# Patient Record
Sex: FEMALE | Race: WHITE | Employment: OTHER | URBAN - METROPOLITAN AREA
[De-identification: names, ages, dates, MRNs, and addresses within clinical notes are randomized per-mention and may not be internally consistent; named-entity substitution may affect disease eponyms.]

---

## 2021-04-08 ENCOUNTER — TELEPHONE (OUTPATIENT)
Dept: INTERNAL MEDICINE CLINIC | Facility: CLINIC | Age: 66
End: 2021-04-08

## 2021-04-08 PROBLEM — N32.0 BLADDER OUTLET OBSTRUCTION: Status: ACTIVE | Noted: 2018-07-16

## 2021-04-08 PROBLEM — M25.50 POLYARTHRALGIA: Status: ACTIVE | Noted: 2018-12-27

## 2021-04-08 PROBLEM — M51.369 DDD (DEGENERATIVE DISC DISEASE), LUMBAR: Status: ACTIVE | Noted: 2019-01-14

## 2021-04-08 PROBLEM — E04.1 LEFT THYROID NODULE: Status: ACTIVE | Noted: 2019-05-07

## 2021-04-08 PROBLEM — M51.36 DDD (DEGENERATIVE DISC DISEASE), LUMBAR: Status: ACTIVE | Noted: 2019-01-14

## 2021-04-08 PROBLEM — R92.8 ABNORMAL MAMMOGRAM OF RIGHT BREAST: Status: ACTIVE | Noted: 2019-01-15

## 2021-04-08 PROBLEM — M79.7 FIBROMYALGIA: Status: ACTIVE | Noted: 2018-12-11

## 2021-04-08 PROBLEM — R73.9 HYPERGLYCEMIA: Status: ACTIVE | Noted: 2018-08-14

## 2021-04-08 PROBLEM — Z85.850 HISTORY OF THYROID CANCER: Status: ACTIVE | Noted: 2021-04-08

## 2021-04-08 PROBLEM — M81.0 OSTEOPOROSIS: Status: ACTIVE | Noted: 2018-08-14

## 2021-04-08 NOTE — TELEPHONE ENCOUNTER
Patient has been vaccinated for covid. Vaccine name: Pfizer   Vaccine date 1: 02/04/2021  Vaccine lot 1: [id number]  Vaccine date 2: 02/25/2021  Vaccine Lot 2: [id number]  Name of location:[facility]       Please remove any covid vaccine orders please.

## 2021-09-02 RX ORDER — CYCLOBENZAPRINE HCL 10 MG
TABLET ORAL
Qty: 90 TABLET | Refills: 0 | Status: SHIPPED | OUTPATIENT
Start: 2021-09-02 | End: 2021-12-01

## 2021-10-26 ENCOUNTER — OFFICE VISIT (OUTPATIENT)
Dept: INTERNAL MEDICINE CLINIC | Facility: CLINIC | Age: 66
End: 2021-10-26
Payer: MEDICARE

## 2021-10-26 VITALS
OXYGEN SATURATION: 100 % | HEIGHT: 69.69 IN | SYSTOLIC BLOOD PRESSURE: 128 MMHG | DIASTOLIC BLOOD PRESSURE: 78 MMHG | HEART RATE: 66 BPM | TEMPERATURE: 98 F | BODY MASS INDEX: 19.52 KG/M2 | WEIGHT: 134.81 LBS

## 2021-10-26 DIAGNOSIS — K58.1 IRRITABLE BOWEL SYNDROME WITH CONSTIPATION: ICD-10-CM

## 2021-10-26 DIAGNOSIS — R10.13 EPIGASTRIC PAIN: Primary | ICD-10-CM

## 2021-10-26 DIAGNOSIS — Z13.220 LIPID SCREENING: ICD-10-CM

## 2021-10-26 DIAGNOSIS — R19.00 ABDOMINAL PULSATILE MASS: ICD-10-CM

## 2021-10-26 DIAGNOSIS — F17.210 CIGARETTE SMOKER: ICD-10-CM

## 2021-10-26 DIAGNOSIS — E04.1 THYROID NODULE GREATER THAN OR EQUAL TO 1.5 CM IN DIAMETER INCIDENTALLY NOTED ON IMAGING STUDY: ICD-10-CM

## 2021-10-26 PROCEDURE — 99214 OFFICE O/P EST MOD 30 MIN: CPT | Performed by: INTERNAL MEDICINE

## 2021-10-26 PROCEDURE — 36415 COLL VENOUS BLD VENIPUNCTURE: CPT | Performed by: INTERNAL MEDICINE

## 2021-10-26 PROCEDURE — 3078F DIAST BP <80 MM HG: CPT | Performed by: INTERNAL MEDICINE

## 2021-10-26 PROCEDURE — 3074F SYST BP LT 130 MM HG: CPT | Performed by: INTERNAL MEDICINE

## 2021-10-26 PROCEDURE — 3008F BODY MASS INDEX DOCD: CPT | Performed by: INTERNAL MEDICINE

## 2021-10-26 NOTE — PROGRESS NOTES
Tadeo Chamorro  77year old female  Patient presents with:  Establish Care  Abdominal Pain: pain got worse recently  . HPI:       49-year-old woman I have known for several years. Last time I saw her her mother had recently .   She now is ta HIVES    Comment:strawberries  Penicillin V            UNKNOWN   CYCLOBENZAPRINE 10 MG Oral Tab, TAKE 1 TABLET NIGHTLY AS NEEDED FOR MUSCLE PAIN, Disp: 90 tablet, Rfl: 0  Oxybutynin Chloride ER 10 MG Oral Tablet 24 Hr, Take 10 mg by mouth daily. Chrissy Contreras last year. I will see her back after her CAT scan and labs. This visit lasted 30 minutes. This note was prepared using [employer] voice recognition dictation software and as a result, errors may occur.  When identified, these errors have be

## 2021-10-29 ENCOUNTER — TELEPHONE (OUTPATIENT)
Dept: INTERNAL MEDICINE CLINIC | Facility: CLINIC | Age: 66
End: 2021-10-29

## 2021-11-04 ENCOUNTER — TELEPHONE (OUTPATIENT)
Dept: INTERNAL MEDICINE CLINIC | Facility: CLINIC | Age: 66
End: 2021-11-04

## 2021-11-04 DIAGNOSIS — K58.1 IRRITABLE BOWEL SYNDROME WITH CONSTIPATION: Primary | ICD-10-CM

## 2021-11-04 NOTE — TELEPHONE ENCOUNTER
Called patient to have them redo labs since sneha received the bag with patient name but wrong tubes that don't belong to our office. Patient stated that her car is in the shop and doesn't when she will get it back.  I told patient to call us back when her

## 2021-11-22 ENCOUNTER — HOSPITAL ENCOUNTER (OUTPATIENT)
Dept: CT IMAGING | Facility: HOSPITAL | Age: 66
Discharge: HOME | End: 2021-11-22
Attending: INTERNAL MEDICINE
Payer: MEDICARE

## 2021-11-22 ENCOUNTER — HOSPITAL ENCOUNTER (OUTPATIENT)
Dept: ULTRASOUND IMAGING | Facility: HOSPITAL | Age: 66
Discharge: HOME | End: 2021-11-22
Attending: INTERNAL MEDICINE
Payer: MEDICARE

## 2021-11-22 DIAGNOSIS — R10.13 EPIGASTRIC PAIN: ICD-10-CM

## 2021-11-22 DIAGNOSIS — R19.00 ABDOMINAL PULSATILE MASS: ICD-10-CM

## 2021-11-22 DIAGNOSIS — E04.1 THYROID NODULE GREATER THAN OR EQUAL TO 1.5 CM IN DIAMETER INCIDENTALLY NOTED ON IMAGING STUDY: ICD-10-CM

## 2021-11-22 PROCEDURE — 76536 US EXAM OF HEAD AND NECK: CPT | Performed by: INTERNAL MEDICINE

## 2021-11-22 PROCEDURE — 74176 CT ABD & PELVIS W/O CONTRAST: CPT | Performed by: INTERNAL MEDICINE

## 2021-12-01 ENCOUNTER — OFFICE VISIT (OUTPATIENT)
Dept: INTERNAL MEDICINE CLINIC | Facility: CLINIC | Age: 66
End: 2021-12-01
Payer: MEDICARE

## 2021-12-01 VITALS
HEART RATE: 53 BPM | BODY MASS INDEX: 19.52 KG/M2 | HEIGHT: 69.69 IN | OXYGEN SATURATION: 94 % | DIASTOLIC BLOOD PRESSURE: 70 MMHG | TEMPERATURE: 97 F | WEIGHT: 134.81 LBS | SYSTOLIC BLOOD PRESSURE: 112 MMHG

## 2021-12-01 DIAGNOSIS — R35.0 URINARY FREQUENCY: ICD-10-CM

## 2021-12-01 DIAGNOSIS — I73.00 RAYNAUD'S DISEASE WITHOUT GANGRENE: Primary | ICD-10-CM

## 2021-12-01 DIAGNOSIS — M81.8 OTHER OSTEOPOROSIS WITHOUT CURRENT PATHOLOGICAL FRACTURE: ICD-10-CM

## 2021-12-01 DIAGNOSIS — I70.0 ATHEROSCLEROSIS OF ABDOMINAL AORTA (HCC): ICD-10-CM

## 2021-12-01 DIAGNOSIS — F17.210 CIGARETTE SMOKER: ICD-10-CM

## 2021-12-01 DIAGNOSIS — E04.2 MULTIPLE THYROID NODULES: ICD-10-CM

## 2021-12-01 PROCEDURE — 99214 OFFICE O/P EST MOD 30 MIN: CPT | Performed by: INTERNAL MEDICINE

## 2021-12-01 PROCEDURE — 36415 COLL VENOUS BLD VENIPUNCTURE: CPT | Performed by: INTERNAL MEDICINE

## 2021-12-01 PROCEDURE — 3074F SYST BP LT 130 MM HG: CPT | Performed by: INTERNAL MEDICINE

## 2021-12-01 PROCEDURE — 3078F DIAST BP <80 MM HG: CPT | Performed by: INTERNAL MEDICINE

## 2021-12-01 PROCEDURE — 3008F BODY MASS INDEX DOCD: CPT | Performed by: INTERNAL MEDICINE

## 2021-12-01 RX ORDER — CYCLOBENZAPRINE HCL 10 MG
10 TABLET ORAL NIGHTLY PRN
Qty: 90 TABLET | Refills: 2 | Status: SHIPPED | OUTPATIENT
Start: 2021-12-01

## 2021-12-01 RX ORDER — OXYBUTYNIN CHLORIDE 10 MG/1
10 TABLET, EXTENDED RELEASE ORAL DAILY
Qty: 90 TABLET | Refills: 3 | Status: SHIPPED | OUTPATIENT
Start: 2021-12-01

## 2021-12-01 RX ORDER — NIFEDIPINE 30 MG/1
30 TABLET, FILM COATED, EXTENDED RELEASE ORAL DAILY
Qty: 30 TABLET | Refills: 2 | Status: SHIPPED | OUTPATIENT
Start: 2021-12-01

## 2021-12-01 RX ORDER — ALENDRONATE SODIUM 70 MG/1
70 TABLET ORAL WEEKLY
Qty: 12 TABLET | Refills: 3 | Status: SHIPPED | OUTPATIENT
Start: 2021-12-01

## 2021-12-01 RX ORDER — NITROFURANTOIN 25; 75 MG/1; MG/1
100 CAPSULE ORAL 2 TIMES DAILY
Qty: 20 CAPSULE | Refills: 0 | Status: SHIPPED | OUTPATIENT
Start: 2021-12-01 | End: 2021-12-11

## 2021-12-01 NOTE — PROGRESS NOTES
Estefania Tyson  77year old female  Patient presents with:  Abdominal Pain: follow-up  Urinary: patient thinks she might have an infection  .           HPI:       Since last visit Soco Fartun has had a CAT scan which did not show any significant path oxybutynin. Myrbetriq was prescribed but was too expensive    HISTORY:     Past Medical History:   Diagnosis Date   • Osteoporosis      Past Surgical History:   Procedure Laterality Date   • Hysterectomy        History reviewed.  No pertinent family histor NIFEdipine 30 MG Oral Tablet 24 Hr; Take 1 tablet (30 mg total) by mouth daily. -     nitrofurantoin monohydrate macro 100 MG Oral Cap; Take 1 capsule (100 mg total) by mouth 2 (two) times daily for 10 days. -     alendronate 70 MG Oral Tab;  Take 1 table

## 2022-01-05 ENCOUNTER — HOSPITAL ENCOUNTER (OUTPATIENT)
Dept: BONE DENSITY | Facility: HOSPITAL | Age: 67
Discharge: HOME | End: 2022-01-05
Attending: INTERNAL MEDICINE
Payer: MEDICARE

## 2022-01-05 DIAGNOSIS — M81.8 OTHER OSTEOPOROSIS WITHOUT CURRENT PATHOLOGICAL FRACTURE: ICD-10-CM

## 2022-01-05 PROCEDURE — 77080 DXA BONE DENSITY AXIAL: CPT | Performed by: INTERNAL MEDICINE

## 2022-01-13 ENCOUNTER — HOSPITAL ENCOUNTER (OUTPATIENT)
Dept: CT IMAGING | Facility: HOSPITAL | Age: 67
Discharge: HOME | End: 2022-01-13
Attending: INTERNAL MEDICINE

## 2022-01-13 DIAGNOSIS — Z13.6 SCREENING FOR CARDIOVASCULAR CONDITION: ICD-10-CM

## 2022-01-14 ENCOUNTER — LAB ENCOUNTER (OUTPATIENT)
Dept: LAB | Facility: HOSPITAL | Age: 67
End: 2022-01-14
Attending: INTERNAL MEDICINE
Payer: MEDICARE

## 2022-01-14 ENCOUNTER — OFFICE VISIT (OUTPATIENT)
Dept: RHEUMATOLOGY | Facility: CLINIC | Age: 67
End: 2022-01-14
Payer: COMMERCIAL

## 2022-01-14 VITALS
HEART RATE: 67 BPM | BODY MASS INDEX: 20.13 KG/M2 | HEIGHT: 69.69 IN | WEIGHT: 139 LBS | DIASTOLIC BLOOD PRESSURE: 80 MMHG | SYSTOLIC BLOOD PRESSURE: 131 MMHG

## 2022-01-14 DIAGNOSIS — I73.00 RAYNAUD'S DISEASE WITHOUT GANGRENE: Primary | ICD-10-CM

## 2022-01-14 DIAGNOSIS — M25.50 POLYARTHRALGIA: ICD-10-CM

## 2022-01-14 LAB
ALBUMIN SERPL-MCNC: 3.8 G/DL (ref 3.4–5)
ALBUMIN/GLOB SERPL: 1.3 {RATIO} (ref 1–2)
ALP LIVER SERPL-CCNC: 70 U/L
ALT SERPL-CCNC: 12 U/L
ANION GAP SERPL CALC-SCNC: 6 MMOL/L (ref 0–18)
AST SERPL-CCNC: 20 U/L (ref 15–37)
BILIRUB SERPL-MCNC: 0.3 MG/DL (ref 0.1–2)
BUN BLD-MCNC: 15 MG/DL (ref 7–18)
BUN/CREAT SERPL: 22.7 (ref 10–20)
CALCIUM BLD-MCNC: 9 MG/DL (ref 8.5–10.1)
CHLORIDE SERPL-SCNC: 105 MMOL/L (ref 98–112)
CK SERPL-CCNC: 69 U/L
CO2 SERPL-SCNC: 31 MMOL/L (ref 21–32)
CREAT BLD-MCNC: 0.66 MG/DL
CRP SERPL-MCNC: <0.29 MG/DL (ref ?–0.3)
ERYTHROCYTE [SEDIMENTATION RATE] IN BLOOD: 10 MM/HR
FASTING STATUS PATIENT QL REPORTED: NO
GLOBULIN PLAS-MCNC: 3 G/DL (ref 2.8–4.4)
GLUCOSE BLD-MCNC: 102 MG/DL (ref 70–99)
OSMOLALITY SERPL CALC.SUM OF ELEC: 295 MOSM/KG (ref 275–295)
POTASSIUM SERPL-SCNC: 4 MMOL/L (ref 3.5–5.1)
PROT SERPL-MCNC: 6.8 G/DL (ref 6.4–8.2)
RHEUMATOID FACT SERPL-ACNC: <10 IU/ML (ref ?–15)
SODIUM SERPL-SCNC: 142 MMOL/L (ref 136–145)

## 2022-01-14 PROCEDURE — 99204 OFFICE O/P NEW MOD 45 MIN: CPT | Performed by: INTERNAL MEDICINE

## 2022-01-14 PROCEDURE — 3075F SYST BP GE 130 - 139MM HG: CPT | Performed by: INTERNAL MEDICINE

## 2022-01-14 PROCEDURE — 80053 COMPREHEN METABOLIC PANEL: CPT | Performed by: INTERNAL MEDICINE

## 2022-01-14 PROCEDURE — 86140 C-REACTIVE PROTEIN: CPT | Performed by: INTERNAL MEDICINE

## 2022-01-14 PROCEDURE — 82550 ASSAY OF CK (CPK): CPT | Performed by: INTERNAL MEDICINE

## 2022-01-14 PROCEDURE — 86200 CCP ANTIBODY: CPT | Performed by: INTERNAL MEDICINE

## 2022-01-14 PROCEDURE — 3008F BODY MASS INDEX DOCD: CPT | Performed by: INTERNAL MEDICINE

## 2022-01-14 PROCEDURE — 36415 COLL VENOUS BLD VENIPUNCTURE: CPT | Performed by: INTERNAL MEDICINE

## 2022-01-14 PROCEDURE — 85652 RBC SED RATE AUTOMATED: CPT | Performed by: INTERNAL MEDICINE

## 2022-01-14 PROCEDURE — 3079F DIAST BP 80-89 MM HG: CPT | Performed by: INTERNAL MEDICINE

## 2022-01-14 PROCEDURE — 86431 RHEUMATOID FACTOR QUANT: CPT | Performed by: INTERNAL MEDICINE

## 2022-01-14 NOTE — PATIENT INSTRUCTIONS
You were seen today for Raynaud's where your fingers can turn white in the cold  Your symptoms appear to be primary, as your blood work for lupus was negative  The medication nifedipine may help with some of your symptoms, please continue to take it  Will to primary Raynaud disease discussed above) and may be more severe. If this is the case for you, you and your healthcare provider can discuss treatment for the underlying condition. What are the risk factors?   Risk factors for Raynaud disease include:  · skin color returns to normal.  In some people, symptoms are persistent or troubling. For these cases, other treatments are a choice. Your healthcare provider can tell you more about the following:  · Prescription medicines.  Some medicines that relax and wi

## 2022-01-14 NOTE — PROGRESS NOTES
Zachariah Casiano is a 77year old female who presents for Patient presents with:  Consult  Raynaud's Syndrome: Evaluation for Raynauds  Finger Pain  .    HPI:   CC: Raynaunds  Consult: referred by PCP Dr. Aidee Feliciano    This is a 76 yo F with hx of Multipl Diagnosis Date   • Osteoporosis       Past Surgical History:   Procedure Laterality Date   • HYSTERECTOMY        No family history on file.    Social History:  Social History    Tobacco Use      Smoking status: Current Every Day Smoker      Smokeless toba 3.80 - 5.10 Million/uL 4.85   Hemoglobin      11.7 - 15.5 g/dL 14.7   Hematocrit      35.0 - 45.0 % 42.9   MCV      80.0 - 100.0 fL 88.5   MCH      27.0 - 33.0 pg 30.3   MCHC      32.0 - 36.0 g/dL 34.3   RDW      11.0 - 15.0 % 12.5   Platelet Count      14 Raynaud's    Chronic pain/fibromyalgia  - Currently on Flexeril 10 mg nightly    Thank you for allowing me to participate in this patients care.  Pt will be notified of blood work and if f/u is needed     Jamari Ferguson MD  1/14/2022  2:07 PM

## 2022-01-18 LAB — CCP IGG SERPL-ACNC: 2 U/ML (ref 0–6.9)

## 2022-01-20 ENCOUNTER — OFFICE VISIT (OUTPATIENT)
Dept: GASTROENTEROLOGY | Facility: CLINIC | Age: 67
End: 2022-01-20
Payer: MEDICARE

## 2022-01-20 ENCOUNTER — TELEPHONE (OUTPATIENT)
Dept: GASTROENTEROLOGY | Facility: CLINIC | Age: 67
End: 2022-01-20

## 2022-01-20 VITALS
HEART RATE: 71 BPM | SYSTOLIC BLOOD PRESSURE: 124 MMHG | HEIGHT: 69 IN | BODY MASS INDEX: 20.73 KG/M2 | DIASTOLIC BLOOD PRESSURE: 75 MMHG | WEIGHT: 140 LBS

## 2022-01-20 DIAGNOSIS — Z12.11 SCREENING FOR COLORECTAL CANCER: ICD-10-CM

## 2022-01-20 DIAGNOSIS — Z87.19 HISTORY OF GASTRIC POLYP: ICD-10-CM

## 2022-01-20 DIAGNOSIS — K21.9 GASTROESOPHAGEAL REFLUX DISEASE, UNSPECIFIED WHETHER ESOPHAGITIS PRESENT: ICD-10-CM

## 2022-01-20 DIAGNOSIS — Z86.010 HISTORY OF COLON POLYPS: Primary | ICD-10-CM

## 2022-01-20 DIAGNOSIS — Z12.11 SCREEN FOR COLON CANCER: Primary | ICD-10-CM

## 2022-01-20 DIAGNOSIS — Z12.12 SCREENING FOR COLORECTAL CANCER: ICD-10-CM

## 2022-01-20 PROCEDURE — 3074F SYST BP LT 130 MM HG: CPT | Performed by: INTERNAL MEDICINE

## 2022-01-20 PROCEDURE — 99203 OFFICE O/P NEW LOW 30 MIN: CPT | Performed by: INTERNAL MEDICINE

## 2022-01-20 PROCEDURE — 3078F DIAST BP <80 MM HG: CPT | Performed by: INTERNAL MEDICINE

## 2022-01-20 PROCEDURE — 3008F BODY MASS INDEX DOCD: CPT | Performed by: INTERNAL MEDICINE

## 2022-01-20 NOTE — TELEPHONE ENCOUNTER
Scheduled for:  Colonoscopy [phone] / [phone]  Provider Name:  Dr. Luis Felipe Maza  Date:  5/17/2022  Location:  [facility]  Sedation:  MAC  Time:  1:45 pm, arrival 12:45 pm  Prep:  split dose magnesium citrate preparation (10 ounces the evening before and 10 ounces

## 2022-01-20 NOTE — PATIENT INSTRUCTIONS
1. Schedule colonoscopy following a split dose magnesium citrate preparation (10 ounces the evening before and 10 ounces 6 hours before the procedure). Take #2 Dulcolax tablets an hour before the first dose of magnesium citrate. Monitored anesthesia care.

## 2022-01-22 PROBLEM — R63.4 WEIGHT LOSS, ABNORMAL: Status: RESOLVED | Noted: 2022-01-22 | Resolved: 2022-01-22

## 2022-01-22 PROBLEM — R63.4 WEIGHT LOSS, ABNORMAL: Status: ACTIVE | Noted: 2022-01-22

## 2022-01-22 NOTE — PROGRESS NOTES
Subjective:   Patient ID: Earnest Simons is a 77year old female. HPI  The patient is referred by Dr. Ciera Alves for colorectal cancer screening in the setting of a previous history of \"polyps\" and chronic abdominal symptoms.     The patient descri above    Wt Readings from Last 7 Encounters:  01/20/22 : 140 lb (63.5 kg)  01/14/22 : 139 lb (63 kg)  12/01/21 : 134 lb 12.8 oz (61.1 kg)  10/26/21 : 134 lb 12.8 oz (61.1 kg)      Current Outpatient Medications   Medication Sig Dispense Refill   • NIFEdipi (H) 95   Sodium      136 - 145 mmol/L 142 144   Potassium      3.5 - 5.1 mmol/L 4.0 4.5   Chloride      98 - 112 mmol/L 105 104   Carbon Dioxide, Total      21.0 - 32.0 mmol/L 31.0 24   ANION GAP      0 - 18 mmol/L 6    BUN      7 - 18 mg/dL 15 12   CREATI ABDOMEN+PELVIS(CPT=74176)       COMPARISON:   None.       INDICATIONS:   R19.00 Abdominal pulsatile mass R10.13 Epigastric pain, generalized abdominal pain.       TECHNIQUE: CT images of the abdomen and pelvis were obtained without intravenous contrast mat             Assessment & Plan:   History of colon polyps  (primary encounter diagnosis)  Screening for colorectal cancer  Gastroesophageal reflux disease, unspecified whether esophagitis present  The patient has a remote history of colon polyps, size and

## 2022-04-11 ENCOUNTER — TELEPHONE (OUTPATIENT)
Dept: GASTROENTEROLOGY | Facility: CLINIC | Age: 67
End: 2022-04-11

## 2022-04-11 NOTE — TELEPHONE ENCOUNTER
Pt may have to have to reschedule her procedure, she wants to know how far out it would be.  Please call

## 2022-08-26 RX ORDER — CYCLOBENZAPRINE HCL 10 MG
10 TABLET ORAL NIGHTLY PRN
Qty: 30 TABLET | Refills: 0 | Status: SHIPPED | OUTPATIENT
Start: 2022-08-26 | End: 2022-09-29

## 2022-09-22 ENCOUNTER — TELEPHONE (OUTPATIENT)
Dept: GASTROENTEROLOGY | Facility: CLINIC | Age: 67
End: 2022-09-22

## 2022-09-22 NOTE — TELEPHONE ENCOUNTER
Managed care    Patient called because she was told by her insurance that her procedure on 10/4 requires a prior authorization. Per referral it is marked as authorized, but I don't see an authorization number. Is she good to proceed with Colonoscopy and EGD as scheduled?     Thank you

## 2022-09-22 NOTE — TELEPHONE ENCOUNTER
Pt states that she called insurance and was told that her 10/4/22 colonoscopy will need prior auth. Please call [facility] at [phone].

## 2022-09-23 NOTE — TELEPHONE ENCOUNTER
Patient contacted. Informed of below message from Mary Bolaños in regards of procedure prior authorization.

## 2022-09-23 NOTE — TELEPHONE ENCOUNTER
Anthony Cline,    A new referral was not created when the patient r/s so I was unaware of the change. Patient's colon/egd is now authorized for 10/4/22.     Thank you  Christiane Nixon

## 2022-09-29 RX ORDER — CYCLOBENZAPRINE HCL 10 MG
10 TABLET ORAL NIGHTLY PRN
Qty: 10 TABLET | Refills: 0 | Status: SHIPPED | OUTPATIENT
Start: 2022-09-29 | End: 2022-10-10

## 2022-10-02 ENCOUNTER — LAB ENCOUNTER (OUTPATIENT)
Dept: LAB | Facility: HOSPITAL | Age: 67
End: 2022-10-02
Attending: INTERNAL MEDICINE
Payer: MEDICARE

## 2022-10-02 DIAGNOSIS — Z01.818 PRE-OP TESTING: ICD-10-CM

## 2022-10-02 LAB — SARS-COV-2 RNA RESP QL NAA+PROBE: NOT DETECTED

## 2022-10-03 ENCOUNTER — TELEPHONE (OUTPATIENT)
Dept: GASTROENTEROLOGY | Facility: CLINIC | Age: 67
End: 2022-10-03

## 2022-10-03 ENCOUNTER — PATIENT MESSAGE (OUTPATIENT)
Dept: GASTROENTEROLOGY | Facility: CLINIC | Age: 67
End: 2022-10-03

## 2022-10-03 RX ORDER — SODIUM PICOSULFATE, MAGNESIUM OXIDE, AND ANHYDROUS CITRIC ACID 10; 3.5; 12 MG/160ML; G/160ML; G/160ML
1 LIQUID ORAL ONCE
Qty: 1 EACH | Refills: 0 | Status: SHIPPED | OUTPATIENT
Start: 2022-10-03 | End: 2022-10-03 | Stop reason: ALTCHOICE

## 2022-10-03 RX ORDER — SODIUM, POTASSIUM,MAG SULFATES 17.5-3.13G
SOLUTION, RECONSTITUTED, ORAL ORAL
Qty: 1 EACH | Refills: 0 | Status: SHIPPED | OUTPATIENT
Start: 2022-10-03

## 2022-10-03 NOTE — TELEPHONE ENCOUNTER
Called and spoke with [facility]. She has no preference on utilizing either Clenpiq (requires PA) vs. Suprep. Informed her I will call her 5 Isaura Klever in [facility] to obtain a price estimate     Spoke to Latosha with [address] [place] [address] Klever, who states that since e-scripts were just sent, there are 10 alternate medications to be processed before she is able to provide an estimate on cost.    Recommended to call back in one hour. I called and informed [facility] on the above. She is aware of plan & will wait for a call back from office/pharmacy to inform her of the cost estimate for the suprep & Clenpiq (if w/o insurance coverage).

## 2022-10-03 NOTE — TELEPHONE ENCOUNTER
Called Pharmacy and spoke with [employer]. States that they just sent a fax to our office, requesting that a new script be sent, as Clenpiq is not covered. States Suprep and Klaus should be covered per patient's plan. Checked faxes, but have not yet received. Patient informed that Suprep was sent to the pharmacy and to contact them with an exact  time. Asked to contact GI office if she runs into any issues in obtaining the Rx.

## 2022-10-03 NOTE — TELEPHONE ENCOUNTER
Please inform the patient that magnesium citrate is not available. We could try to obtain prior authorization for Clenpiq which would be the lowest volume preparation or Suprep. Patient preference.

## 2022-10-04 NOTE — TELEPHONE ENCOUNTER
Called patient's pharmacy and spoke with Teddy Smart, Pharmacist.    States that Suprep needed to be ordered and was therefore unable to be fulfilled yesterday evening. Inquired if patient picked up the other prep that was sent in, Clenpiq, which she informs she did not. Called to speak with [facility] regarding the above & determine if/how she prepped for today's procedure. Will likely need to cancel. LMTCB. Please transfer to GI RN. Thank you!

## 2022-10-10 ENCOUNTER — OFFICE VISIT (OUTPATIENT)
Dept: INTERNAL MEDICINE CLINIC | Facility: CLINIC | Age: 67
End: 2022-10-10
Payer: MEDICARE

## 2022-10-10 VITALS
DIASTOLIC BLOOD PRESSURE: 88 MMHG | BODY MASS INDEX: 20 KG/M2 | WEIGHT: 136 LBS | TEMPERATURE: 97 F | SYSTOLIC BLOOD PRESSURE: 130 MMHG | OXYGEN SATURATION: 99 % | HEART RATE: 71 BPM

## 2022-10-10 DIAGNOSIS — Z12.31 BREAST CANCER SCREENING BY MAMMOGRAM: ICD-10-CM

## 2022-10-10 DIAGNOSIS — F17.210 CIGARETTE SMOKER: ICD-10-CM

## 2022-10-10 DIAGNOSIS — R35.0 URINARY FREQUENCY: ICD-10-CM

## 2022-10-10 DIAGNOSIS — N32.89 BLADDER WALL THICKENING: ICD-10-CM

## 2022-10-10 DIAGNOSIS — M81.8 OTHER OSTEOPOROSIS WITHOUT CURRENT PATHOLOGICAL FRACTURE: Primary | ICD-10-CM

## 2022-10-10 LAB
APPEARANCE: CLEAR
BILIRUBIN: NEGATIVE
GLUCOSE (URINE DIPSTICK): NEGATIVE MG/DL
KETONES (URINE DIPSTICK): NEGATIVE MG/DL
LEUKOCYTES: NEGATIVE
MULTISTIX LOT#: ABNORMAL NUMERIC
NITRITE, URINE: NEGATIVE
PH, URINE: 6.5 (ref 4.5–8)
PROTEIN (URINE DIPSTICK): NEGATIVE MG/DL
SPECIFIC GRAVITY: 1.01 (ref 1–1.03)
UROBILINOGEN,SEMI-QN: 0.2 MG/DL (ref 0–1.9)

## 2022-10-10 RX ORDER — CYCLOBENZAPRINE HCL 10 MG
10 TABLET ORAL NIGHTLY PRN
Qty: 10 TABLET | Refills: 0 | Status: CANCELLED | OUTPATIENT
Start: 2022-10-10

## 2022-10-10 RX ORDER — CYCLOBENZAPRINE HCL 10 MG
10 TABLET ORAL NIGHTLY PRN
Qty: 30 TABLET | Refills: 3 | Status: SHIPPED | OUTPATIENT
Start: 2022-10-10

## 2022-10-11 ENCOUNTER — LAB REQUISITION (OUTPATIENT)
Dept: SURGERY | Age: 67
End: 2022-10-11
Payer: MEDICARE

## 2022-10-11 DIAGNOSIS — Z01.818 PREOP EXAMINATION: ICD-10-CM

## 2022-10-12 LAB — SARS-COV-2 RNA RESP QL NAA+PROBE: NOT DETECTED

## 2022-10-14 ENCOUNTER — LAB REQUISITION (OUTPATIENT)
Dept: SURGERY | Age: 67
End: 2022-10-14
Payer: MEDICARE

## 2022-10-14 ENCOUNTER — SURGERY CENTER DOCUMENTATION (OUTPATIENT)
Dept: SURGERY | Age: 67
End: 2022-10-14

## 2022-10-14 DIAGNOSIS — Z12.11 COLON CANCER SCREENING: ICD-10-CM

## 2022-10-14 DIAGNOSIS — Z87.19 PERSONAL HISTORY OF OTHER DISEASES OF DIGESTIVE SYSTEM: ICD-10-CM

## 2022-10-14 DIAGNOSIS — K21.9 GERD (GASTROESOPHAGEAL REFLUX DISEASE): ICD-10-CM

## 2022-10-14 PROCEDURE — 43239 EGD BIOPSY SINGLE/MULTIPLE: CPT | Performed by: INTERNAL MEDICINE

## 2022-10-14 PROCEDURE — 88312 SPECIAL STAINS GROUP 1: CPT | Performed by: INTERNAL MEDICINE

## 2022-10-14 PROCEDURE — 88305 TISSUE EXAM BY PATHOLOGIST: CPT | Performed by: INTERNAL MEDICINE

## 2022-10-14 PROCEDURE — 45385 COLONOSCOPY W/LESION REMOVAL: CPT | Performed by: INTERNAL MEDICINE

## 2022-11-02 ENCOUNTER — TELEPHONE (OUTPATIENT)
Dept: GASTROENTEROLOGY | Facility: CLINIC | Age: 67
End: 2022-11-02

## 2022-11-03 NOTE — TELEPHONE ENCOUNTER
----- Message from Tiffany Ho MD sent at 10/30/2022  5:14 PM CDT -----  Hi Ms. Francee Saint recent colonoscopy revealed #6 small polyps which were removed. #1 was a serrated adenoma. This is the type of polyp that has a potential to become a tumor or cancer, however, at this stage was small, benign and completely removed. Most polyps of this size and type do not progress to cancer. The remainder of the polyps were hyperplastic which are of no consequence. Uncomplicated diverticulosis was present in the left side of the colon. The upper endoscopic examination was normal.  Biopsies of the esophagus revealed mild microscopic changes of reflux. Biopsies of the stomach and small intestine showed no signs of any unusual inflammation or infection. If the reflux symptoms are frequent and problematic, I can prescribe a medication to treat the reflux. Please let me know. I would recommend a high-fiber diet for the diverticulosis. Based on the serrated adenoma, a colonoscopy should be repeated in 5 years. If I can answer any questions regarding the above, please do not hesitate to contact me. Sincerely,    Dr. Aguilar Diamond RNs: Please enter colonoscopy recall for 5 years.

## 2022-11-14 RX ORDER — OXYBUTYNIN CHLORIDE 10 MG/1
TABLET, EXTENDED RELEASE ORAL
Qty: 90 TABLET | Refills: 0 | Status: SHIPPED | OUTPATIENT
Start: 2022-11-14

## 2023-02-23 RX ORDER — ALENDRONATE SODIUM 70 MG/1
70 TABLET ORAL WEEKLY
Qty: 12 TABLET | Refills: 0 | Status: SHIPPED | OUTPATIENT
Start: 2023-02-23

## 2023-02-23 RX ORDER — OXYBUTYNIN CHLORIDE 10 MG/1
10 TABLET, EXTENDED RELEASE ORAL DAILY
Qty: 90 TABLET | Refills: 0 | Status: SHIPPED | OUTPATIENT
Start: 2023-02-23

## 2023-03-02 ENCOUNTER — TELEPHONE (OUTPATIENT)
Dept: INTERNAL MEDICINE CLINIC | Facility: CLINIC | Age: 68
End: 2023-03-02

## 2023-04-12 ENCOUNTER — OFFICE VISIT (OUTPATIENT)
Dept: INTERNAL MEDICINE CLINIC | Facility: CLINIC | Age: 68
End: 2023-04-12
Payer: MEDICARE

## 2023-04-12 VITALS
BODY MASS INDEX: 21.33 KG/M2 | WEIGHT: 144 LBS | OXYGEN SATURATION: 95 % | DIASTOLIC BLOOD PRESSURE: 82 MMHG | HEIGHT: 69 IN | SYSTOLIC BLOOD PRESSURE: 134 MMHG | HEART RATE: 72 BPM

## 2023-04-12 DIAGNOSIS — M81.0 AGE-RELATED OSTEOPOROSIS WITHOUT CURRENT PATHOLOGICAL FRACTURE: ICD-10-CM

## 2023-04-12 DIAGNOSIS — I70.0 ATHEROSCLEROSIS OF ABDOMINAL AORTA (HCC): ICD-10-CM

## 2023-04-12 DIAGNOSIS — M51.36 DDD (DEGENERATIVE DISC DISEASE), LUMBAR: ICD-10-CM

## 2023-04-12 DIAGNOSIS — Z87.891 FORMER CIGARETTE SMOKER: Primary | ICD-10-CM

## 2023-04-12 DIAGNOSIS — R35.0 URINARY FREQUENCY: ICD-10-CM

## 2023-04-12 DIAGNOSIS — I73.00 RAYNAUD'S DISEASE WITHOUT GANGRENE: ICD-10-CM

## 2023-04-12 DIAGNOSIS — M79.7 FIBROMYALGIA: ICD-10-CM

## 2023-04-12 PROBLEM — F17.210 CIGARETTE SMOKER: Status: RESOLVED | Noted: 2021-10-26 | Resolved: 2023-04-12

## 2023-04-12 PROCEDURE — 1125F AMNT PAIN NOTED PAIN PRSNT: CPT | Performed by: INTERNAL MEDICINE

## 2023-04-12 PROCEDURE — G0438 PPPS, INITIAL VISIT: HCPCS | Performed by: INTERNAL MEDICINE

## 2023-04-12 PROCEDURE — 96160 PT-FOCUSED HLTH RISK ASSMT: CPT | Performed by: INTERNAL MEDICINE

## 2023-04-12 PROCEDURE — 3075F SYST BP GE 130 - 139MM HG: CPT | Performed by: INTERNAL MEDICINE

## 2023-04-12 PROCEDURE — 3008F BODY MASS INDEX DOCD: CPT | Performed by: INTERNAL MEDICINE

## 2023-04-12 PROCEDURE — 3079F DIAST BP 80-89 MM HG: CPT | Performed by: INTERNAL MEDICINE

## 2023-04-12 PROCEDURE — 99213 OFFICE O/P EST LOW 20 MIN: CPT | Performed by: INTERNAL MEDICINE

## 2023-05-10 ENCOUNTER — TELEMEDICINE (OUTPATIENT)
Dept: INTERNAL MEDICINE CLINIC | Facility: CLINIC | Age: 68
End: 2023-05-10
Payer: MEDICARE

## 2023-05-10 DIAGNOSIS — J02.0 PHARYNGITIS DUE TO STREPTOCOCCUS SPECIES: Primary | ICD-10-CM

## 2023-05-10 PROCEDURE — 1160F RVW MEDS BY RX/DR IN RCRD: CPT | Performed by: INTERNAL MEDICINE

## 2023-05-10 PROCEDURE — 1159F MED LIST DOCD IN RCRD: CPT | Performed by: INTERNAL MEDICINE

## 2023-05-10 PROCEDURE — 99213 OFFICE O/P EST LOW 20 MIN: CPT | Performed by: INTERNAL MEDICINE

## 2023-05-10 RX ORDER — AZITHROMYCIN 250 MG/1
TABLET, FILM COATED ORAL
Qty: 6 TABLET | Refills: 0 | Status: SHIPPED | OUTPATIENT
Start: 2023-05-10 | End: 2023-05-15

## 2023-05-18 ENCOUNTER — TELEPHONE (OUTPATIENT)
Dept: INTERNAL MEDICINE CLINIC | Facility: CLINIC | Age: 68
End: 2023-05-18

## 2023-05-18 DIAGNOSIS — M79.7 FIBROMYALGIA: ICD-10-CM

## 2023-05-18 RX ORDER — CYCLOBENZAPRINE HCL 10 MG
10 TABLET ORAL NIGHTLY PRN
Qty: 30 TABLET | Refills: 2 | Status: SHIPPED | OUTPATIENT
Start: 2023-05-18

## 2023-05-18 RX ORDER — OXYBUTYNIN CHLORIDE 10 MG/1
10 TABLET, EXTENDED RELEASE ORAL DAILY
Qty: 90 TABLET | Refills: 0 | Status: SHIPPED | OUTPATIENT
Start: 2023-05-18

## 2023-05-18 RX ORDER — ALENDRONATE SODIUM 70 MG/1
70 TABLET ORAL WEEKLY
Qty: 12 TABLET | Refills: 0 | Status: SHIPPED | OUTPATIENT
Start: 2023-05-18

## 2023-08-06 DIAGNOSIS — M79.7 FIBROMYALGIA: ICD-10-CM

## 2023-08-07 RX ORDER — ALENDRONATE SODIUM 70 MG/1
70 TABLET ORAL WEEKLY
Qty: 12 TABLET | Refills: 0 | Status: SHIPPED | OUTPATIENT
Start: 2023-08-07

## 2023-08-07 RX ORDER — OXYBUTYNIN CHLORIDE 10 MG/1
10 TABLET, EXTENDED RELEASE ORAL DAILY
Qty: 90 TABLET | Refills: 0 | Status: SHIPPED | OUTPATIENT
Start: 2023-08-07

## 2023-08-07 RX ORDER — CYCLOBENZAPRINE HCL 10 MG
10 TABLET ORAL NIGHTLY PRN
Qty: 30 TABLET | Refills: 0 | Status: SHIPPED | OUTPATIENT
Start: 2023-08-07

## 2023-08-21 RX ORDER — OXYBUTYNIN CHLORIDE 10 MG/1
10 TABLET, EXTENDED RELEASE ORAL DAILY
Qty: 90 TABLET | Refills: 0 | OUTPATIENT
Start: 2023-08-21

## 2023-08-24 ENCOUNTER — TELEPHONE (OUTPATIENT)
Dept: INTERNAL MEDICINE CLINIC | Facility: CLINIC | Age: 68
End: 2023-08-24

## 2023-08-24 ENCOUNTER — TELEMEDICINE (OUTPATIENT)
Dept: INTERNAL MEDICINE CLINIC | Facility: CLINIC | Age: 68
End: 2023-08-24
Payer: MEDICARE

## 2023-08-24 DIAGNOSIS — R05.1 ACUTE COUGH: Primary | ICD-10-CM

## 2023-08-24 PROCEDURE — 1159F MED LIST DOCD IN RCRD: CPT | Performed by: INTERNAL MEDICINE

## 2023-08-24 PROCEDURE — 99213 OFFICE O/P EST LOW 20 MIN: CPT | Performed by: INTERNAL MEDICINE

## 2023-08-24 PROCEDURE — 1160F RVW MEDS BY RX/DR IN RCRD: CPT | Performed by: INTERNAL MEDICINE

## 2023-08-24 RX ORDER — AZITHROMYCIN 250 MG/1
TABLET, FILM COATED ORAL
Qty: 6 TABLET | Refills: 0 | Status: SHIPPED | OUTPATIENT
Start: 2023-08-24 | End: 2023-08-28

## 2023-08-24 NOTE — TELEPHONE ENCOUNTER
Spoke with patient with with clogged drippy nose with yellow discharge x 2 weeks. Cough x 4 day with yellow phlegm that she can feel on the chest. She has sore throat. Right eye ache 5/10. She states that her temperature is 99. She is using Afrin,Tylenol 650 mg and decongestion. Home Covid test today is negative. She thinks she has Bronchitis.       Future Appointments   Date Time Provider [place]   8/24/2023  2:40 PM Tammy Pantoja MD [facility] MARIA DOLORES   10/12/2023  2:00 PM Tammy Pantoja MD [facility] ARMINDA [facility] MARIA DOLORES

## 2023-08-24 NOTE — PROGRESS NOTES
[place] [facility] female 76year old  No chief complaint on file. Chief complaint: Cough congestion  Video visit  Sabas Kyle has fibromyalgia rainouts thyroid nodules and back pain. Complaining of cough and congestion for the last 2 days. Cough is productive she has had a fever. Yesterday she was in bed denies any significant shortness of breath pulse ox 97. Has no history of COPD although was a smoker up until recently. Denies any wheezing. She did do a COVID test which was negative this morning. Patient Active Problem List:     DDD (degenerative disc disease), lumbar     Fibromyalgia     Osteoporosis     Multiple thyroid nodules     Atherosclerosis of abdominal aorta (HCC)     Raynaud's disease without gangrene     Urinary frequency         alendronate 70 MG Oral Tab, Take 1 tablet (70 mg total) by mouth once a week., Disp: 12 tablet, Rfl: 0  oxybutynin ER 10 MG Oral Tablet 24 Hr, Take 1 tablet (10 mg total) by mouth daily. , Disp: 90 tablet, Rfl: 0  cyclobenzaprine 10 MG Oral Tab, Take 1 tablet (10 mg total) by mouth nightly as needed. , Disp: 30 tablet, Rfl: 0    No current facility-administered medications on file prior to visit. There were no vitals filed for this visit. Reva Royal is no height or weight on file to calculate BMI. Pertinent findings on the physical exam; she does not look toxic cough is deep and productive sounding. No tachypnea noted. Diagnoses and all orders for this visit:    Acute cough       Discussed her cough differential diagnosis of pneumonia versus bronchitis the fact that she had a fever and was sick we will treat her for pneumonia. If she has any significant shortness of breath she is to come see me or go to the emergency room. Other issues are stable. Denies any wheezing so we will hold off on any steroids. This note was prepared using [employer] voice recognition dictation software and as a result, errors may occur.  When identified, these errors have been corrected.  While every attempt is made to correct errors during dictation, discrepancies may still exist

## 2023-08-24 NOTE — TELEPHONE ENCOUNTER
Pt is calling stating that since Tuesday started with fever, congestion and chills. Pt also mention she did today a covid test and it was negative. Pt would like to get antibiotics she believes she might have bronchitis.       Please call and advise

## 2023-08-29 ENCOUNTER — OFFICE VISIT (OUTPATIENT)
Dept: INTERNAL MEDICINE CLINIC | Facility: CLINIC | Age: 68
End: 2023-08-29
Payer: MEDICARE

## 2023-08-29 ENCOUNTER — HOSPITAL ENCOUNTER (OUTPATIENT)
Dept: GENERAL RADIOLOGY | Facility: HOSPITAL | Age: 68
Discharge: HOME | End: 2023-08-29
Attending: INTERNAL MEDICINE
Payer: MEDICARE

## 2023-08-29 ENCOUNTER — LAB ENCOUNTER (OUTPATIENT)
Dept: LAB | Facility: HOSPITAL | Age: 68
End: 2023-08-29
Attending: INTERNAL MEDICINE
Payer: MEDICARE

## 2023-08-29 VITALS
SYSTOLIC BLOOD PRESSURE: 136 MMHG | DIASTOLIC BLOOD PRESSURE: 80 MMHG | HEART RATE: 77 BPM | WEIGHT: 149 LBS | OXYGEN SATURATION: 94 % | BODY MASS INDEX: 22 KG/M2

## 2023-08-29 DIAGNOSIS — R05.1 ACUTE COUGH: ICD-10-CM

## 2023-08-29 DIAGNOSIS — R03.0 ELEVATED BLOOD PRESSURE READING WITHOUT DIAGNOSIS OF HYPERTENSION: ICD-10-CM

## 2023-08-29 DIAGNOSIS — R09.89 RHONCHI AT BOTH LUNG BASES: ICD-10-CM

## 2023-08-29 DIAGNOSIS — R05.1 ACUTE COUGH: Primary | ICD-10-CM

## 2023-08-29 LAB
ANION GAP SERPL CALC-SCNC: 8 MMOL/L (ref 0–18)
BASOPHILS # BLD AUTO: 0.03 X10(3) UL (ref 0–0.2)
BASOPHILS NFR BLD AUTO: 0.4 %
BUN BLD-MCNC: 8 MG/DL (ref 7–18)
BUN/CREAT SERPL: 10.3 (ref 10–20)
CALCIUM BLD-MCNC: 9.4 MG/DL (ref 8.5–10.1)
CHLORIDE SERPL-SCNC: 102 MMOL/L (ref 98–112)
CO2 SERPL-SCNC: 31 MMOL/L (ref 21–32)
CREAT BLD-MCNC: 0.78 MG/DL
DEPRECATED RDW RBC AUTO: 41.4 FL (ref 35.1–46.3)
EGFRCR SERPLBLD CKD-EPI 2021: 83 ML/MIN/1.73M2 (ref 60–?)
EOSINOPHIL # BLD AUTO: 0.27 X10(3) UL (ref 0–0.7)
EOSINOPHIL NFR BLD AUTO: 3.8 %
ERYTHROCYTE [DISTWIDTH] IN BLOOD BY AUTOMATED COUNT: 12.6 % (ref 11–15)
FASTING STATUS PATIENT QL REPORTED: NO
GLUCOSE BLD-MCNC: 54 MG/DL (ref 70–99)
HCT VFR BLD AUTO: 38.3 %
HGB BLD-MCNC: 12.4 G/DL
IMM GRANULOCYTES # BLD AUTO: 0.02 X10(3) UL (ref 0–1)
IMM GRANULOCYTES NFR BLD: 0.3 %
LYMPHOCYTES # BLD AUTO: 2.53 X10(3) UL (ref 1–4)
LYMPHOCYTES NFR BLD AUTO: 35.9 %
MCH RBC QN AUTO: 29.5 PG (ref 26–34)
MCHC RBC AUTO-ENTMCNC: 32.4 G/DL (ref 31–37)
MCV RBC AUTO: 91 FL
MONOCYTES # BLD AUTO: 0.94 X10(3) UL (ref 0.1–1)
MONOCYTES NFR BLD AUTO: 13.3 %
NEUTROPHILS # BLD AUTO: 3.26 X10 (3) UL (ref 1.5–7.7)
NEUTROPHILS # BLD AUTO: 3.26 X10(3) UL (ref 1.5–7.7)
NEUTROPHILS NFR BLD AUTO: 46.3 %
OSMOLALITY SERPL CALC.SUM OF ELEC: 288 MOSM/KG (ref 275–295)
PLATELET # BLD AUTO: 256 10(3)UL (ref 150–450)
POTASSIUM SERPL-SCNC: 3.6 MMOL/L (ref 3.5–5.1)
RBC # BLD AUTO: 4.21 X10(6)UL
SODIUM SERPL-SCNC: 141 MMOL/L (ref 136–145)
WBC # BLD AUTO: 7.1 X10(3) UL (ref 4–11)

## 2023-08-29 PROCEDURE — 99214 OFFICE O/P EST MOD 30 MIN: CPT | Performed by: INTERNAL MEDICINE

## 2023-08-29 PROCEDURE — 3079F DIAST BP 80-89 MM HG: CPT | Performed by: INTERNAL MEDICINE

## 2023-08-29 PROCEDURE — 80048 BASIC METABOLIC PNL TOTAL CA: CPT | Performed by: INTERNAL MEDICINE

## 2023-08-29 PROCEDURE — 85025 COMPLETE CBC W/AUTO DIFF WBC: CPT | Performed by: INTERNAL MEDICINE

## 2023-08-29 PROCEDURE — 1159F MED LIST DOCD IN RCRD: CPT | Performed by: INTERNAL MEDICINE

## 2023-08-29 PROCEDURE — 1160F RVW MEDS BY RX/DR IN RCRD: CPT | Performed by: INTERNAL MEDICINE

## 2023-08-29 PROCEDURE — 36415 COLL VENOUS BLD VENIPUNCTURE: CPT | Performed by: INTERNAL MEDICINE

## 2023-08-29 PROCEDURE — 71046 X-RAY EXAM CHEST 2 VIEWS: CPT | Performed by: INTERNAL MEDICINE

## 2023-08-29 PROCEDURE — 3075F SYST BP GE 130 - 139MM HG: CPT | Performed by: INTERNAL MEDICINE

## 2023-08-29 RX ORDER — IBUPROFEN 600 MG/1
TABLET ORAL
COMMUNITY
Start: 2023-08-10

## 2023-08-30 ENCOUNTER — TELEPHONE (OUTPATIENT)
Dept: INTERNAL MEDICINE CLINIC | Facility: CLINIC | Age: 68
End: 2023-08-30

## 2023-08-30 RX ORDER — ALBUTEROL SULFATE 90 UG/1
2 AEROSOL, METERED RESPIRATORY (INHALATION) EVERY 6 HOURS PRN
Qty: 1 EACH | Refills: 1 | Status: SHIPPED | OUTPATIENT
Start: 2023-08-30

## 2023-08-30 RX ORDER — PREDNISONE 20 MG/1
40 TABLET ORAL DAILY
Qty: 10 TABLET | Refills: 0 | Status: SHIPPED | OUTPATIENT
Start: 2023-08-30 | End: 2023-09-04

## 2023-09-12 DIAGNOSIS — M79.7 FIBROMYALGIA: ICD-10-CM

## 2023-09-12 RX ORDER — CYCLOBENZAPRINE HCL 10 MG
10 TABLET ORAL NIGHTLY PRN
Qty: 30 TABLET | Refills: 0 | Status: SHIPPED | OUTPATIENT
Start: 2023-09-12

## 2023-09-28 ENCOUNTER — HOSPITAL ENCOUNTER (OUTPATIENT)
Dept: CT IMAGING | Facility: HOSPITAL | Age: 68
Discharge: HOME | End: 2023-09-28
Attending: INTERNAL MEDICINE
Payer: MEDICARE

## 2023-09-28 DIAGNOSIS — Z87.891 FORMER CIGARETTE SMOKER: ICD-10-CM

## 2023-09-28 PROCEDURE — 71271 CT THORAX LUNG CANCER SCR C-: CPT | Performed by: INTERNAL MEDICINE

## 2023-10-12 ENCOUNTER — OFFICE VISIT (OUTPATIENT)
Dept: INTERNAL MEDICINE CLINIC | Facility: CLINIC | Age: 68
End: 2023-10-12
Payer: MEDICARE

## 2023-10-12 VITALS
DIASTOLIC BLOOD PRESSURE: 82 MMHG | OXYGEN SATURATION: 97 % | HEART RATE: 60 BPM | BODY MASS INDEX: 22.36 KG/M2 | WEIGHT: 151 LBS | HEIGHT: 69 IN | SYSTOLIC BLOOD PRESSURE: 128 MMHG

## 2023-10-12 DIAGNOSIS — J43.2 CENTRILOBULAR EMPHYSEMA (HCC): ICD-10-CM

## 2023-10-12 DIAGNOSIS — Z87.891 FORMER CIGARETTE SMOKER: ICD-10-CM

## 2023-10-12 DIAGNOSIS — M41.86 LEVOSCOLIOSIS OF LUMBAR SPINE: ICD-10-CM

## 2023-10-12 DIAGNOSIS — J41.0 SMOKERS' COUGH (HCC): ICD-10-CM

## 2023-10-12 DIAGNOSIS — M79.7 FIBROMYALGIA: Primary | ICD-10-CM

## 2023-10-12 DIAGNOSIS — K59.03 DRUG-INDUCED CONSTIPATION: ICD-10-CM

## 2023-10-12 DIAGNOSIS — I70.0 AORTIC CALCIFICATION (HCC): ICD-10-CM

## 2023-10-12 RX ORDER — OXYBUTYNIN CHLORIDE 10 MG/1
10 TABLET, EXTENDED RELEASE ORAL DAILY
Qty: 90 TABLET | Refills: 3 | Status: SHIPPED | OUTPATIENT
Start: 2023-10-12

## 2023-10-12 RX ORDER — DOXYCYCLINE HYCLATE 100 MG/1
100 CAPSULE ORAL 2 TIMES DAILY
Qty: 20 CAPSULE | Refills: 0 | Status: SHIPPED | OUTPATIENT
Start: 2023-10-12

## 2023-10-12 RX ORDER — ALENDRONATE SODIUM 70 MG/1
70 TABLET ORAL WEEKLY
Qty: 12 TABLET | Refills: 2 | Status: SHIPPED | OUTPATIENT
Start: 2023-10-12

## 2023-10-12 RX ORDER — CYCLOBENZAPRINE HCL 10 MG
10 TABLET ORAL NIGHTLY PRN
Qty: 90 TABLET | Refills: 2 | Status: SHIPPED | OUTPATIENT
Start: 2023-10-12

## 2024-01-04 ENCOUNTER — OFFICE VISIT (OUTPATIENT)
Dept: INTERNAL MEDICINE CLINIC | Facility: CLINIC | Age: 69
End: 2024-01-04
Payer: MEDICARE

## 2024-01-04 VITALS
HEART RATE: 68 BPM | WEIGHT: 153 LBS | BODY MASS INDEX: 23 KG/M2 | DIASTOLIC BLOOD PRESSURE: 70 MMHG | OXYGEN SATURATION: 96 % | TEMPERATURE: 98 F | SYSTOLIC BLOOD PRESSURE: 124 MMHG

## 2024-01-04 DIAGNOSIS — I70.0 AORTIC CALCIFICATION (HCC): ICD-10-CM

## 2024-01-04 DIAGNOSIS — R31.29 MICROSCOPIC HEMATURIA: ICD-10-CM

## 2024-01-04 DIAGNOSIS — M54.41 CHRONIC LEFT-SIDED LOW BACK PAIN WITH BILATERAL SCIATICA: ICD-10-CM

## 2024-01-04 DIAGNOSIS — G89.29 CHRONIC LEFT-SIDED LOW BACK PAIN WITH BILATERAL SCIATICA: ICD-10-CM

## 2024-01-04 DIAGNOSIS — R30.0 DYSURIA: Primary | ICD-10-CM

## 2024-01-04 DIAGNOSIS — M81.0 AGE-RELATED OSTEOPOROSIS WITHOUT CURRENT PATHOLOGICAL FRACTURE: ICD-10-CM

## 2024-01-04 DIAGNOSIS — M79.7 FIBROMYALGIA: ICD-10-CM

## 2024-01-04 DIAGNOSIS — R35.0 URINARY FREQUENCY: ICD-10-CM

## 2024-01-04 DIAGNOSIS — M54.42 CHRONIC LEFT-SIDED LOW BACK PAIN WITH BILATERAL SCIATICA: ICD-10-CM

## 2024-01-04 DIAGNOSIS — J43.2 CENTRILOBULAR EMPHYSEMA (HCC): ICD-10-CM

## 2024-01-04 DIAGNOSIS — J41.0 SMOKERS' COUGH (HCC): ICD-10-CM

## 2024-01-04 LAB
APPEARANCE: CLEAR
BILIRUBIN: NEGATIVE
GLUCOSE (URINE DIPSTICK): NEGATIVE MG/DL
KETONES (URINE DIPSTICK): NEGATIVE MG/DL
LEUKOCYTES: NEGATIVE
MULTISTIX LOT#: ABNORMAL NUMERIC
NITRITE, URINE: NEGATIVE
PH, URINE: 6 (ref 4.5–8)
PROTEIN (URINE DIPSTICK): NEGATIVE MG/DL
SPECIFIC GRAVITY: 1 (ref 1–1.03)
URINE-COLOR: YELLOW
UROBILINOGEN,SEMI-QN: 0.2 MG/DL (ref 0–1.9)

## 2024-01-04 PROCEDURE — 3074F SYST BP LT 130 MM HG: CPT | Performed by: INTERNAL MEDICINE

## 2024-01-04 PROCEDURE — 3078F DIAST BP <80 MM HG: CPT | Performed by: INTERNAL MEDICINE

## 2024-01-04 PROCEDURE — 81003 URINALYSIS AUTO W/O SCOPE: CPT | Performed by: INTERNAL MEDICINE

## 2024-01-04 PROCEDURE — 1159F MED LIST DOCD IN RCRD: CPT | Performed by: INTERNAL MEDICINE

## 2024-01-04 PROCEDURE — 1160F RVW MEDS BY RX/DR IN RCRD: CPT | Performed by: INTERNAL MEDICINE

## 2024-01-04 PROCEDURE — 99214 OFFICE O/P EST MOD 30 MIN: CPT | Performed by: INTERNAL MEDICINE

## 2024-01-04 RX ORDER — NITROFURANTOIN 25; 75 MG/1; MG/1
100 CAPSULE ORAL 2 TIMES DAILY
Qty: 10 CAPSULE | Refills: 0 | Status: SHIPPED | OUTPATIENT
Start: 2024-01-04 | End: 2024-01-09

## 2024-01-04 NOTE — PROGRESS NOTES
Taya Bruce female 68 year old         Chief Complaint   Patient presents with    Urinary Frequency     Freq  had fever one  day       Had previous  bladder issues  with cystos  multiple times  for bladder emptying  issues  and microscopic hematuria  -  on  oxy for 10 yrs       Breathing  -is doing well on no inhalers has emphysema noted on CT lung cancer screening.    Still with cough     Low back pain  chronic  disc  -has left-sided sciatic sx  for  few  day  - has it on and off -she appears in significant pain but states she has this frequently and it goes away after a few days.  At this time not interested in doing further workup.  Does take cyclobenzaprine for chronic back issues.    Symptoms do not sound like compression fracture does have osteoporosis    Fibromyalgia stable      Has  athersclerosis  - doesn't sound like  AAA         Patient Active Problem List   Diagnosis    DDD (degenerative disc disease), lumbar    Fibromyalgia    Osteoporosis    Multiple thyroid nodules    Atherosclerosis of abdominal aorta (HCC)    Raynaud's disease without gangrene    Urinary frequency          Current Outpatient Medications on File Prior to Visit   Medication Sig Dispense Refill    alendronate 70 MG Oral Tab Take 1 tablet (70 mg total) by mouth once a week. 12 tablet 2    cyclobenzaprine 10 MG Oral Tab Take 1 tablet (10 mg total) by mouth nightly as needed. 90 tablet 2    oxybutynin ER 10 MG Oral Tablet 24 Hr Take 1 tablet (10 mg total) by mouth daily. 90 tablet 3     No current facility-administered medications on file prior to visit.          Vitals:    01/04/24 1122   BP: 124/70   Pulse: 68   Temp: 98 °F (36.7 °C)   VITALSBody mass index is 22.59 kg/m².    Pertinent findings on the physical exam; she does not appear toxic.  She has no CVA tenderness.  Lungs are clear but decreased breath sounds.  She does have pain getting on the table where she arches her back.  Straight leg was positive on the left  side she also has some motor weakness sensation intact.  Abdomen unremarkable for pulsating lesion    Taya was seen today for urinary frequency.    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       Her urinalysis had some blood she states that she has had it chronically.  Has had many scopes in the past.    There is no nitrates or leukocytes.  Discussed differential diagnosis of stones versus infection versus tumors.    We decided we would treat her with an antibiotic if symptoms improve then that would be a good prognosis discussed rechecking her urine for blood however since she has chronic hematuria it will be hard to interpret I told her the standard of care is repeat cystoscopies.  He did agree to come back and see me in a month to do that.  If her symptoms do not improve I will see her sooner.    Her emphysema seems stable.  Continues to have probable smoker's cough.  Is taking OTC medicine to help it.    She has aortic calcification currently not on any statins or aspirin.  Has no obvious cardiac issues.  I have low suspicion for aneurysm    For osteoporosis is on Fosamax her symptoms do not sound like a compression fracture.    She has significant leg sciatic symptoms but she is like to hold off on any workup at this time.  I asked her to get back to me if symptoms do not improve would consider MRI.              This note was prepared using [employer] voice recognition dictation software and as a result, errors may occur. When  identified, these errors have been corrected. While every attempt is made to correct errors during dictation, discrepancies may still exist

## 2024-01-30 ENCOUNTER — TELEPHONE (OUTPATIENT)
Dept: INTERNAL MEDICINE CLINIC | Facility: CLINIC | Age: 69
End: 2024-01-30

## 2024-01-30 NOTE — TELEPHONE ENCOUNTER
I called [facility], spoke with Luis Miguel. Provided dx code R35.1 Nocturia for oxybutynin. Should receive a determination in 72 hrs.

## 2024-01-30 NOTE — TELEPHONE ENCOUNTER
Ref number [id number]      Calling for piror authorization on medication Oxybutynin ER 10 MG oral tablet

## 2024-01-31 ENCOUNTER — NURSE TRIAGE (OUTPATIENT)
Dept: INTERNAL MEDICINE CLINIC | Facility: CLINIC | Age: 69
End: 2024-01-31

## 2024-01-31 ENCOUNTER — TELEPHONE (OUTPATIENT)
Dept: INTERNAL MEDICINE CLINIC | Facility: CLINIC | Age: 69
End: 2024-01-31

## 2024-01-31 NOTE — TELEPHONE ENCOUNTER
Received faxed from [facility] review Oxybutynin has been authorized until 12/31/24.   Spoke with [place] pharmacy who states that medication is ready for pt to .   Spoke with pt. Made her aware of above information. She states that pharmacy wanted to charge her for Oxybutynin. She would discuss with Dr. Rangel tomorrow.

## 2024-02-01 ENCOUNTER — OFFICE VISIT (OUTPATIENT)
Dept: INTERNAL MEDICINE CLINIC | Facility: CLINIC | Age: 69
End: 2024-02-01
Payer: MEDICARE

## 2024-02-01 VITALS
OXYGEN SATURATION: 96 % | SYSTOLIC BLOOD PRESSURE: 134 MMHG | HEART RATE: 77 BPM | WEIGHT: 154 LBS | BODY MASS INDEX: 22.81 KG/M2 | DIASTOLIC BLOOD PRESSURE: 62 MMHG | HEIGHT: 69 IN

## 2024-02-01 DIAGNOSIS — G89.29 CHRONIC LEFT-SIDED LOW BACK PAIN WITH BILATERAL SCIATICA: ICD-10-CM

## 2024-02-01 DIAGNOSIS — M54.42 CHRONIC LEFT-SIDED LOW BACK PAIN WITH BILATERAL SCIATICA: ICD-10-CM

## 2024-02-01 DIAGNOSIS — J06.9 VIRAL UPPER RESPIRATORY TRACT INFECTION: Primary | ICD-10-CM

## 2024-02-01 DIAGNOSIS — M54.41 CHRONIC LEFT-SIDED LOW BACK PAIN WITH BILATERAL SCIATICA: ICD-10-CM

## 2024-02-01 DIAGNOSIS — J43.2 CENTRILOBULAR EMPHYSEMA (HCC): ICD-10-CM

## 2024-02-01 DIAGNOSIS — N32.81 OVERACTIVE BLADDER: ICD-10-CM

## 2024-02-01 DIAGNOSIS — Z12.31 BREAST CANCER SCREENING BY MAMMOGRAM: ICD-10-CM

## 2024-02-01 PROBLEM — J44.9 COPD, MILD (HCC): Chronic | Status: ACTIVE | Noted: 2024-02-01

## 2024-02-01 PROCEDURE — 99214 OFFICE O/P EST MOD 30 MIN: CPT | Performed by: INTERNAL MEDICINE

## 2024-02-01 PROCEDURE — 1159F MED LIST DOCD IN RCRD: CPT | Performed by: INTERNAL MEDICINE

## 2024-02-01 PROCEDURE — 3078F DIAST BP <80 MM HG: CPT | Performed by: INTERNAL MEDICINE

## 2024-02-01 PROCEDURE — 1160F RVW MEDS BY RX/DR IN RCRD: CPT | Performed by: INTERNAL MEDICINE

## 2024-02-01 PROCEDURE — 3008F BODY MASS INDEX DOCD: CPT | Performed by: INTERNAL MEDICINE

## 2024-02-01 PROCEDURE — 3075F SYST BP GE 130 - 139MM HG: CPT | Performed by: INTERNAL MEDICINE

## 2024-02-01 NOTE — PROGRESS NOTES
Taya Bruce female 68 year old         Chief Complaint   Patient presents with    Fever    Cough    Runny Nose     Uri  sx  in chest  - sx  for  5 days   fever  for  2  days      Hoarse     Had UTI in early  jan  abx  helped  nitrofurantion       Discussed  blader  and  2 anticholineric meds        Patient Active Problem List   Diagnosis    DDD (degenerative disc disease), lumbar    Fibromyalgia    Osteoporosis    Multiple thyroid nodules    Atherosclerosis of abdominal aorta (HCC)    Raynaud's disease without gangrene    Urinary frequency    COPD, mild (HCC)          Current Outpatient Medications on File Prior to Visit   Medication Sig Dispense Refill    alendronate 70 MG Oral Tab Take 1 tablet (70 mg total) by mouth once a week. 12 tablet 2    cyclobenzaprine 10 MG Oral Tab Take 1 tablet (10 mg total) by mouth nightly as needed. 90 tablet 2    oxybutynin ER 10 MG Oral Tablet 24 Hr Take 1 tablet (10 mg total) by mouth daily. 90 tablet 3     No current facility-administered medications on file prior to visit.          Vitals:    02/01/24 1327   BP: 134/62   Pulse: 77   VITALSBody mass index is 22.74 kg/m².    Pertinent findings on the physical exam; cough  non toxic   lungs clear      Taya was seen today for fever, cough and runny nose.    Diagnoses and all orders for this visit:    Viral upper respiratory tract infection    Breast cancer screening by mammogram  -     [facility] TRACY 2D+3D SCREENING BILAT (CPT=77067/89160); Future    Overactive bladder    Centrilobular emphysema (HCC)    Chronic left-sided low back pain with bilateral sciatica           Hold off abx  since  high likely   is viral      Discussed anticholenrgic  risk and sxs -  caution about use for bladderand back     Sciatica sxs are better today     Copd is stable - no meds now     Keep on fosamx       This note was prepared using Dragon [employer] voice recognition dictation software and as a result, errors may occur. When identified, these  errors have been corrected. While every attempt is made to correct errors during dictation, discrepancies may still exist

## 2024-03-26 NOTE — TELEPHONE ENCOUNTER
From: Benito Velasquez  To: Beverly Malagon  Sent: 3/26/2024 4:17 PM CDT  Subject: Clonidine    Beverly,  I have noticed that this medication is not helping fact it with my sleeping issues it was causing me to be more tired in the morning. So I made the stupid mistake of stopping the medication. I have an appointment with Luc[facility]neros tomorrow about my BP.    [facility]      Patient needs lab redrawn since [facility] received specimens that were not collected from our office.  We did draw her blood when she came in and label with tubes but when [facility] came to pick it up, they claimed that they received tubes with hand wri

## 2024-04-10 ENCOUNTER — OFFICE VISIT (OUTPATIENT)
Dept: INTERNAL MEDICINE CLINIC | Facility: CLINIC | Age: 69
End: 2024-04-10
Payer: MEDICARE

## 2024-04-10 VITALS
WEIGHT: 151 LBS | SYSTOLIC BLOOD PRESSURE: 132 MMHG | OXYGEN SATURATION: 97 % | BODY MASS INDEX: 22.36 KG/M2 | DIASTOLIC BLOOD PRESSURE: 70 MMHG | HEART RATE: 63 BPM | HEIGHT: 69 IN

## 2024-04-10 DIAGNOSIS — J43.2 CENTRILOBULAR EMPHYSEMA (HCC): ICD-10-CM

## 2024-04-10 DIAGNOSIS — G89.29 CHRONIC LEFT-SIDED LOW BACK PAIN WITH LEFT-SIDED SCIATICA: Primary | ICD-10-CM

## 2024-04-10 DIAGNOSIS — J44.9 COPD, MILD (HCC): Chronic | ICD-10-CM

## 2024-04-10 DIAGNOSIS — N32.81 OVERACTIVE BLADDER: ICD-10-CM

## 2024-04-10 DIAGNOSIS — E04.2 MULTIPLE THYROID NODULES: ICD-10-CM

## 2024-04-10 DIAGNOSIS — M51.36 DDD (DEGENERATIVE DISC DISEASE), LUMBAR: ICD-10-CM

## 2024-04-10 DIAGNOSIS — M54.42 CHRONIC LEFT-SIDED LOW BACK PAIN WITH LEFT-SIDED SCIATICA: Primary | ICD-10-CM

## 2024-04-10 DIAGNOSIS — M81.0 AGE-RELATED OSTEOPOROSIS WITHOUT CURRENT PATHOLOGICAL FRACTURE: ICD-10-CM

## 2024-04-10 DIAGNOSIS — I70.0 ATHEROSCLEROSIS OF ABDOMINAL AORTA (HCC): ICD-10-CM

## 2024-04-10 DIAGNOSIS — I73.00 RAYNAUD'S DISEASE WITHOUT GANGRENE: ICD-10-CM

## 2024-04-10 DIAGNOSIS — I70.0 AORTIC CALCIFICATION (HCC): ICD-10-CM

## 2024-04-10 DIAGNOSIS — M79.7 FIBROMYALGIA: ICD-10-CM

## 2024-04-10 PROBLEM — R35.0 URINARY FREQUENCY: Status: RESOLVED | Noted: 2021-12-01 | Resolved: 2024-04-10

## 2024-04-10 NOTE — PROGRESS NOTES
Taya Bruce is a 68 year old  who presents for a MA (Medicare Advantage) Supervisit (Once per calendar year)    Discussed medicare wellness , reviewed assessments and health maintenance for screening and immunizations.    In addition medical issues  addressed today included ;  low back pain getting  worse -  hx of disc dz  effects  left leg      Brother   of covid compliacions ??  Heart dz  , liver disease        Allergies:   is allergic to strawberry and penicillin v.  Medical History:    has a past medical history of Osteoporosis and PONV (postoperative nausea and vomiting).  Surgical History:    has a past surgical history that includes hysterectomy.   Family History:   family history includes Cancer in her maternal uncle.  Social History:    reports that she quit smoking about 15 months ago. Her smoking use included cigarettes. She has never used smokeless tobacco. She reports that she does not currently use alcohol. No history on file for drug use.        Fall Risk Assessment:   She has been screened for Falls and is low risk.      Cognitive Assessment:   She had a completely normal cognitive assessment - see flowsheet entries     Functional Ability/Status:   Taya Bruce has a completely normal functional assessment. See flowsheet for details.        Depression Screening (PHQ-2/PHQ-9): PHQ-2 SCORE: 0  , done 4/10/2024        Has insomnia        Advanced Directives:   DNR      Tobacco:  She smoked tobacco in the past but quit greater than 12 months ago.  Social History     Tobacco Use   Smoking Status Former    Current packs/day: 0.00    Types: Cigarettes    Quit date: 2023    Years since quittin.2   Smokeless Tobacco Never        CAGE Alcohol Screen:   CAGE screening score of 0 on 2024, showing low risk of alcohol abuse.          Patient Care Team:  Carlos Rangel MD as PCP - General (Internal Medicine)          Objective:   /70 (BP Location: Right arm, Patient  Position: Sitting, Cuff Size: adult)   Pulse 63   Ht 5' 9\" (1.753 m)   Wt 151 lb (68.5 kg)   SpO2 97%   BMI 22.30 kg/m²    Estimated body mass index is 22.3 kg/m² as calculated from the following:    Height as of this encounter: 5' 9\" (1.753 m).    Weight as of this encounter: 151 lb (68.5 kg).  Very thin no acute distress.    Head/neck ; nl     Lungs: Clear moves air fairly well despite diagnosis of COPD  Heart: Regular  Abdominal exam she states that every time I palpate her she feels tenderness.  But has no ongoing pain.    Ext; nl no cyanosis noted    Neurological: No focal deficit, nl cognition         Medicare Hearing Assessment:  Hearing Screening    Time taken: 4/10/2024  1:24 PM  Entry User: Carlos Rangel MD  Screening Method: Finger Rub  Finger Rub Result: Pass       Visual Acuity:   Visual Acuity:   Right Eye Visual Acuity: Uncorrected Right Eye Chart Acuity: 20/25   Left Eye Visual Acuity: Uncorrected Left Eye Chart Acuity: 20/25   Both Eyes Visual Acuity: Uncorrected Both Eyes Chart Acuity: 20/25   Able To Tolerate Visual Acuity: Yes        Current Outpatient Medications:     alendronate 70 MG Oral Tab, Take 1 tablet (70 mg total) by mouth once a week., Disp: 12 tablet, Rfl: 2    cyclobenzaprine 10 MG Oral Tab, Take 1 tablet (10 mg total) by mouth nightly as needed., Disp: 90 tablet, Rfl: 2    oxybutynin ER 10 MG Oral Tablet 24 Hr, Take 1 tablet (10 mg total) by mouth daily., Disp: 90 tablet, Rfl: 3     ASSESSMENT  and   PLAN    Medicare wellness  Dicussed prevention screening test and immunization for age  Reinforced healthy diet, lifestyle, and exercise. Discussed current state of health.    Medical issues     1. Chronic left-sided low back pain with left-sided sciatica (Primary)-for her chronic left-sided back pain and known degenerative joint disease will refer to physiatry for another look at her situation.  It has been a long time since this was worked up.  -     PHYSIATRY - INTERNAL;  Future; Expected date: 04/10/2024  2. Centrilobular emphysema (HCC)-noted on previous imaging is not on any inhalers.  Denies any shortness of breath history of smoking  3. Overactive bladder-has improved with medications we will therefore continue it  4. Aortic calcification (HCC)-noted on imaging no symptoms.  Would like her to consider statin therapy.  Has no known peripheral arterial disease.  Calcium score was 0.  Has no obvious aneurysms.  5. Age-related osteoporosis without current pathological fracture-is currently on bisphosphonate.  6. Raynaud's disease without gangrene-from her history of smoking-has quit  7. Multiple thyroid nodules-history of thyroidectomy.  Will consider follow-up ultrasound in the future  8. COPD, mild (HCC)-has no obvious concerning symptoms on no inhalers.  9. DDD (degenerative disc disease), lumbar  Overview:    DEGENERATIVE DISC DISEASE, CERVICAL SPINE, W/RADICULOPATHY-will refer to physiatr  10. Fibromyalgia-Flexeril helps this.  Will continue it benefits outweigh risk                No follow-ups on file.     Carlos Rangel MD      General Health:  In the past six months, have you lost more than 10 pounds without trying?: 2 - No  Has your appetite been poor?: No  Type of Diet: Other  How does the patient maintain a good energy level?: Daily Walks  How would you describe your daily physical activity?: Moderate  How would you describe your current health state?: Good  How do you maintain positive mental well-being?: Social Interaction;Games;Visiting Friends;Visiting Family  On a scale of 0 to 10, with 0 being no pain and 10 being severe pain, what is your pain level?: 6 - (Moderate)  In the past six months, have you experienced urine leakage?: 0-No  At any time do you feel concerned for the safety/well-being of yourself and/or your children, in your home or elsewhere?: No       Taya Bruce's SCREENING SCHEDULE   Tests on this list are recommended by your physician but  may not be covered, or covered at this frequency, by your insurer.   Please check with your insurance carrier before scheduling to verify coverage.   PREVENTATIVE SERVICES FREQUENCY &  COVERAGE DETAILS LAST COMPLETION DATE   Diabetes Screening    Fasting Blood Sugar /  Glucose    One screening every 12 months if never tested or if previously tested but not diagnosed with pre-diabetes   One screening every 6 months if diagnosed with pre-diabetes Lab Results   Component Value Date    GLU 54 (L) 08/29/2023        Cardiovascular Disease Screening    Lipid Panel  Cholesterol  Lipoprotein (HDL)  Triglycerides Covered every 5 years for all Medicare beneficiaries without apparent signs or symptoms of cardiovascular disease Lab Results   Component Value Date    CHOLEST 230 (H) 12/01/2021    HDL 92 12/01/2021     (H) 12/01/2021    TRIG 88 12/01/2021         Electrocardiogram (EKG)   Covered if needed at Welcome to Medicare, and non-screening if indicated for medical reasons -      Ultrasound Screening for Abdominal Aortic Aneurysm (AAA) Covered once in a lifetime for one of the following risk factors    Men who are 65-75 years old and have ever smoked    Anyone with a family history -     Colorectal Cancer Screening  Covered for ages 50-85; only need ONE of the following:    Colonoscopy   Covered every 10 years    Covered every 2 years if patient is at high risk or previous colonoscopy was abnormal 10/14/2022    Health Maintenance   Topic Date Due    Colorectal Cancer Screening  10/14/2027       Flexible Sigmoidoscopy   Covered every 4 years -    Fecal Occult Blood Test Covered annually -   Bone Density Screening    Bone density screening    Covered every 2 years after age 65 if diagnosed with risk of osteoporosis or estrogen deficiency.    Covered yearly for long-term glucocorticoid medication use (Steroids) Last Dexa Scan:    XR DEXA BONE DENSITOMETRY (CPT=77080) 01/05/2022      No recommendations at this time   Pap  and Pelvic    Pap   Covered every 2 years for women at normal risk; Annually if at high risk -  No recommendations at this time    Chlamydia Annually if high risk -  No recommendations at this time   Screening Mammogram    Mammogram     Recommend annually for all female patients aged 40 and older    One baseline mammogram covered for patients aged 35-39 03/13/2023    Health Maintenance   Topic Date Due    Mammogram  03/13/2024       Immunizations    Influenza Covered once per flu season  Please get every year 10/11/2023  No recommendations at this time    Pneumococcal Each vaccine ([id number] & [id number]) covered once after 65 Prevnar 13: 08/26/2015    Dhbxhisof06: 10/19/2021     No recommendations at this time    Hepatitis B One screening covered for patients with certain risk factors   -  No recommendations at this time    Tetanus Toxoid Not covered by Medicare Part B unless medically necessary (cut with metal); may be covered with your pharmacy prescription benefits -    Tetanus, Diptheria and Pertusis TD and TDaP Not covered by Medicare Part B -  No recommendations at this time    Zoster Not covered by Medicare Part B; may be covered with your pharmacy  prescription benefits 08/26/2015  Zoster Vaccines(2 of 3) due on 10/21/2015     Chronic Obstructive Pulmonary Disease (COPD)    Spirometry Annually Spirometry date:

## 2024-04-20 ENCOUNTER — OFFICE VISIT (OUTPATIENT)
Dept: FAMILY MEDICINE CLINIC | Facility: CLINIC | Age: 69
End: 2024-04-20
Payer: MEDICARE

## 2024-04-20 VITALS
HEIGHT: 69 IN | TEMPERATURE: 98 F | BODY MASS INDEX: 22 KG/M2 | RESPIRATION RATE: 16 BRPM | SYSTOLIC BLOOD PRESSURE: 136 MMHG | HEART RATE: 73 BPM | OXYGEN SATURATION: 97 % | DIASTOLIC BLOOD PRESSURE: 84 MMHG

## 2024-04-20 DIAGNOSIS — J02.9 SORE THROAT: ICD-10-CM

## 2024-04-20 DIAGNOSIS — J06.9 VIRAL URI: Primary | ICD-10-CM

## 2024-04-20 LAB
CONTROL LINE PRESENT WITH A CLEAR BACKGROUND (YES/NO): YES YES/NO
KIT LOT #: NORMAL NUMERIC
STREP GRP A CUL-SCR: NEGATIVE

## 2024-04-20 PROCEDURE — 99213 OFFICE O/P EST LOW 20 MIN: CPT | Performed by: NURSE PRACTITIONER

## 2024-04-20 PROCEDURE — 87880 STREP A ASSAY W/OPTIC: CPT | Performed by: NURSE PRACTITIONER

## 2024-04-20 NOTE — PROGRESS NOTES
CHIEF COMPLAINT:     Chief Complaint   Patient presents with    Sore Throat     Sx yesterday - ST, PND, nasal congestion, runny nose, L ear pain/pressure, sinus pressure, body aches  Denies cough, fever, chills, SOB, chest pain  Negative Covid test today  No OTC       HPI:   Taya Bruce is a 68 year old female who presents for upper respiratory symptoms for  1 days. Patient reports sore throat, congestion, denies fever, denies cough. Symptoms have been persisting since onset.  Treating symptoms with nothing.   Denies sob or chest pain.   Current Outpatient Medications   Medication Sig Dispense Refill    alendronate 70 MG Oral Tab Take 1 tablet (70 mg total) by mouth once a week. 12 tablet 2    cyclobenzaprine 10 MG Oral Tab Take 1 tablet (10 mg total) by mouth nightly as needed. 90 tablet 2    oxybutynin ER 10 MG Oral Tablet 24 Hr Take 1 tablet (10 mg total) by mouth daily. 90 tablet 3      Past Medical History:    Osteoporosis    PONV (postoperative nausea and vomiting)      Past Surgical History:   Procedure Laterality Date    Hysterectomy           Social History     Socioeconomic History    Marital status:    Tobacco Use    Smoking status: Former     Current packs/day: 0.00     Types: Cigarettes     Quit date: 2023     Years since quittin.3    Smokeless tobacco: Never   Substance and Sexual Activity    Alcohol use: Not Currently         REVIEW OF SYSTEMS:   GENERAL: see HPI  SKIN: no rashes or abnormal skin lesions  HEENT: See HPI  LUNGS: denies shortness of breath or wheezing, See HPI  CARDIOVASCULAR: denies chest pain or palpitations   GI: denies N/V/C or abdominal pain      EXAM:   /84   Pulse 73   Temp 98.4 °F (36.9 °C)   Resp 16   Ht 5' 9\" (1.753 m)   SpO2 97%   BMI 22.30 kg/m²   GENERAL: well nourished,in no apparent distress  SKIN: no rashes,no suspicious lesions  HEAD: atraumatic, normocephalic.    EYES: conjunctiva clear, EOM intact  EARS: TM's gray, no bulging, no  retraction,no fluid, bony landmarks intact  NOSE: Nostrils patent, clear nasal discharge, nasal mucosa pink and swollen  THROAT: Oral mucosa pink, moist. Posterior pharynx is  erythematous. no exudates.   NECK: Supple, non-tender  LUNGS: clear to auscultation bilaterally. Breathing is non labored.  CARDIO: RRR without murmur  LYMPH:  no cervical lymphadenopathy.        ASSESSMENT AND PLAN:   Taya Bruce is a 68 year old female who presents with     ASSESSMENT:   Encounter Diagnoses   Name Primary?    Sore throat     Viral URI Yes   Rapid strep negative      PLAN:  Tylenol, fluids, rest  Comfort care as described in Patient Instructions  Discussed viral vs bacterial etiology of URIs. Patient was informed that antibiotics are not effective for treating viral ailments and can result in antibiotic resistence. Reviewed symptom relief measures with patient.  The patient indicates understanding of these issues and agrees to the plan.  Instructed F/U with PCP in 1 week if no improvement

## 2024-06-10 ENCOUNTER — OFFICE VISIT (OUTPATIENT)
Dept: PHYSICAL MEDICINE AND REHAB | Facility: CLINIC | Age: 69
End: 2024-06-10
Payer: MEDICARE

## 2024-06-10 ENCOUNTER — HOSPITAL ENCOUNTER (OUTPATIENT)
Dept: GENERAL RADIOLOGY | Facility: HOSPITAL | Age: 69
Discharge: HOME | End: 2024-06-10
Attending: PHYSICAL MEDICINE & REHABILITATION
Payer: MEDICARE

## 2024-06-10 VITALS
OXYGEN SATURATION: 98 % | RESPIRATION RATE: 18 BRPM | WEIGHT: 151 LBS | HEART RATE: 78 BPM | HEIGHT: 69 IN | BODY MASS INDEX: 22.36 KG/M2

## 2024-06-10 DIAGNOSIS — G89.29 CHRONIC LEFT-SIDED LOW BACK PAIN WITH LEFT-SIDED SCIATICA: ICD-10-CM

## 2024-06-10 DIAGNOSIS — M47.816 LUMBAR SPONDYLOSIS: ICD-10-CM

## 2024-06-10 DIAGNOSIS — M54.42 CHRONIC LEFT-SIDED LOW BACK PAIN WITH LEFT-SIDED SCIATICA: ICD-10-CM

## 2024-06-10 DIAGNOSIS — M48.061 LUMBAR FORAMINAL STENOSIS: ICD-10-CM

## 2024-06-10 DIAGNOSIS — M54.59 MECHANICAL LOW BACK PAIN: ICD-10-CM

## 2024-06-10 DIAGNOSIS — M51.37 DDD (DEGENERATIVE DISC DISEASE), LUMBOSACRAL: ICD-10-CM

## 2024-06-10 DIAGNOSIS — M51.16 LUMBAR DISC HERNIATION WITH RADICULOPATHY: ICD-10-CM

## 2024-06-10 DIAGNOSIS — M51.36 BULGE OF LUMBAR DISC WITHOUT MYELOPATHY: ICD-10-CM

## 2024-06-10 DIAGNOSIS — M47.816 FACET SYNDROME, LUMBAR: ICD-10-CM

## 2024-06-10 DIAGNOSIS — M81.0 AGE-RELATED OSTEOPOROSIS WITHOUT CURRENT PATHOLOGICAL FRACTURE: ICD-10-CM

## 2024-06-10 DIAGNOSIS — M79.10 MYALGIA: ICD-10-CM

## 2024-06-10 DIAGNOSIS — M79.10 MYALGIA: Primary | ICD-10-CM

## 2024-06-10 PROCEDURE — 72110 X-RAY EXAM L-2 SPINE 4/>VWS: CPT | Performed by: PHYSICAL MEDICINE & REHABILITATION

## 2024-06-10 RX ORDER — NAPROXEN 500 MG/1
500 TABLET ORAL 2 TIMES DAILY WITH MEALS
Qty: 60 TABLET | Refills: 0 | Status: SHIPPED | OUTPATIENT
Start: 2024-06-10

## 2024-06-10 NOTE — H&P
[facility]  Clinic H&P    Requesting Physician: Carlos Rangel MD    Chief Complaint (Reason for Visit):    Chief Complaint   Patient presents with    Low Back Pain     New right handed Pt is coming in for bilateral low back pain l>R, Pt states that she has sciatic pain on left side, Admits to Numbness in bilateral hands and feet, states that she has a history of herniated discs. Pain 5/10. Takes cyclobenzaprine, states that medication does not help. No recent PT states that she has done some in the past but no improvement, No recent imaging        History of Present Illness:  The patient is a 68 year old right-handed female with significant past medical history of osteoporosis who presents with bilateral low back pain with radiation down both legs (left greater than right) to the hamstring region.  She rates her pain about a 5-6 out of 10, aching, and radiating as described above.  Her symptoms are aggravated with laying primarily on her right side and improved by standing up.  She has tried cyclobenzaprine as per her PCP.  Her last physical therapy session was several years ago.  No recent imaging has been performed.  Her last injections were about 10 years ago and she states she had her nerves burned.  She denies any paresthesias or focal weakness although she does have a history of a right ankle injury where she states she has right ankle weakness.  PAST MEDICAL HISTORY:   Past Medical History:    Osteoporosis    PONV (postoperative nausea and vomiting)       PAST SURGICAL HISTORY:   Past Surgical History:   Procedure Laterality Date    Hysterectomy          FAMILY HISTORY:   Family History   Problem Relation Age of Onset    Cancer Maternal Uncle        SOCIAL HISTORY:   Social History     Occupational History    Not on file   Tobacco Use    Smoking status: Former     Current packs/day: 0.00     Types: Cigarettes     Quit date: 1/1/2023     Years since quitting:  1.4    Smokeless tobacco: Never   Substance and Sexual Activity    Alcohol use: Not Currently    Drug use: Not on file    Sexual activity: Not on file       CURRENT MEDICATIONS:   Current Outpatient Medications   Medication Sig Dispense Refill    naproxen (NAPROSYN) 500 MG Oral Tab Take 1 tablet (500 mg total) by mouth 2 (two) times daily with meals. 60 tablet 0    alendronate 70 MG Oral Tab Take 1 tablet (70 mg total) by mouth once a week. 12 tablet 2    cyclobenzaprine 10 MG Oral Tab Take 1 tablet (10 mg total) by mouth nightly as needed. 90 tablet 2    oxybutynin ER 10 MG Oral Tablet 24 Hr Take 1 tablet (10 mg total) by mouth daily. 90 tablet 3        ALLERGIES:   Allergies   Allergen Reactions    Strawberry HIVES     strawberries    Penicillin V UNKNOWN         REVIEW OF SYSTEMS:   Review of Systems   Constitutional: Negative.    HENT: Negative.    Eyes: Negative.    Respiratory: Negative.    Cardiovascular: Negative.    Gastrointestinal: Negative.    Genitourinary: Negative.    Musculoskeletal: As per HPI   Skin: Negative.    Neurological: As per HPI  Endo/Heme/Allergies: Negative.    Psychiatric/Behavioral: Negative.      All other systems reviewed and are negative. Pertinent positives and negatives noted in the HPI.        PHYSICAL EXAM:   Pulse 78   Resp 18   Ht 69\"   Wt 151 lb (68.5 kg)   SpO2 98%   BMI 22.30 kg/m²     Body mass index is 22.3 kg/m².      General: No immediate distress  Head: Normocephalic/ Atraumatic  Eyes: Extra-occular movements intact.   Ears: No auricular hematoma or deformities  Mouth: No lesions or ulcerations  Heart: peripheral pulses intact. Normal capillary refill.   Lungs: Non-labored respirations  Abdomen: No abdominal guarding  Extremities: No lower extremity edema bilaterally   Skin: No lesions noted.   Cognition: alert & oriented x 3, attentive, able to follow 2 step commands, comprehention intact, spontaneous speech intact  Motor:    Musculoskeletal:    LUMBAR  SPINE:  Inspection: no erythema, swelling, or obvious deformity.  Their iliac crest and shoulder heights are symmetrical.  Postural exam reveals no scoliosis or kyphosis.  Palpation: To palpation midline lumbar spine as well as bilateral lower lumbar facets and paraspinals, bilateral glutes and piriformis, bilateral greater trochanter and IT band  ROM: Full active range of motion of the lumbar spine with pain during flexion and extension  Motor: 5/5 in all myotomes of the BILATERAL lower extremities except 4 out of 5 during left great toe extension and plantarflexion  Sensation: Intact to light touch in all dermatomes of the lower extremities except decrease sensation to light touch of the left L3, L4, L5, and S1 dermatomes  Reflexes: 2/4 at L4 and S1 except 1/4 at left S1  Facet Loading: Positive bilaterally for axial low back pain  TYREL: Negative  Slump test: Positive on the left for radicular symptoms down the left leg  Gait:  Normal    Data  Office Visit on 04/20/2024   Component Date Value Ref Range Status    Strep Grp A Screen 04/20/2024 Negative  Negative Final    Control Line Present with a clear * 04/20/2024 Yes  Yes/No Final    Kit Lot # 04/20/2024 [id number]  Numeric Final    Kit Expiration Date 04/20/2024 03/01/2025  Date Final   Office Visit on 01/04/2024   Component Date Value Ref Range Status    Glucose Urine 01/04/2024 Negative  Negative mg/dL Final    Bilirubin Urine 01/04/2024 Negative  Negative Final    Ketones, UA 01/04/2024 Negative  Negative - Trace mg/dL Final    Spec Gravity 01/04/2024 1.0005 (A)  1.005 - 1.030 Final    Blood Urine 01/04/2024 Moderate (A)  Negative Final    PH Urine 01/04/2024 6.0  5.0 - 8.0 Final    Protein Urine 01/04/2024 Negative  Negative - Trace mg/dL Final    Urobilinogen Urine 01/04/2024 0.2  0.2 - 1.0 mg/dL Final    Nitrite Urine 01/04/2024 Negative  Negative Final    Leukocyte Esterase Urine 01/04/2024 Negative  Negative Final    APPEARANCE 01/04/2024 CLEAR  Clear  Final    Color Urine 01/04/2024 YELLOW  Yellow Final    Multistix Lot# 01/04/2024 [id number]  Numeric Final    Multistix Expiration Date 01/04/2024 04/31/2024  Date Final   ]      Radiology Imaging:  NA    ASSESSMENT AND PLAN:  Taya is a pleasant 68-year-old female presents for complaints of bilateral low back pain with radiation down both legs (left greater than right).  On exam, she has tenderness to palpation throughout the lumbosacral spine with weakness during left great toe extension and plantarflexion.  She also has a drop reflex on the left.  I am recommending x-rays of the lumbar spine and formal physical therapy.  She should continue the cyclobenzaprine and start Naprosyn twice per day.  She can also use Tylenol throughout the day.  She will follow-up with me in about 6 to 8 weeks.  If her symptoms persist, then I would recommend an MRI of the lumbar spine.       RTC in 6 to 8 weeks    Discharge Instructions were provided as documented in AVS summary.  The patient was in agreement with the assessment and plan.  All questions were answered.  There were no barriers to learning.         1. Myalgia    2. Chronic left-sided low back pain with left-sided sciatica    3. Facet syndrome, lumbar    4. Mechanical low back pain    5. Lumbar spondylosis    6. DDD (degenerative disc disease), lumbosacral    7. Lumbar foraminal stenosis    8. Bulge of lumbar disc without myelopathy    9. Lumbar disc herniation with radiculopathy    10. Age-related osteoporosis without current pathological fracture        Alex B. Behar MD, [facility]  Physical Medicine and Rehabilitation/Sports Medicine  [facility]

## 2024-06-10 NOTE — PATIENT INSTRUCTIONS
Tylenol 500-1000 mg every 6-8 hours as needed for pain.  No more than 3000 mg daily.  Start cyclobenzaprine 5 mg 0.5-2 tablets three times per day as needed for spasms. Do not operate heavy machinery while on this medication as it may make you sleepy.    Take Naprosyn 500 mg 1 tablet twice per day with food for the next two weeks and then as needed but no more than 2 tablets per day. Do not take with any other NSAIDS (Ibuprofen, Advil, Aleve, etc.). OK to take Tylenol 500 mg every 6 hours as needed for pain. If you develop any side effects including stomach aches, nausea, vomiting, or other gastrointestinal symptoms, stop the medication and call my office.    Please begin physical therapy as soon as possible.   Follow-up with me in 6 to 8 weeks after you  begin physical therapy. If symptoms do not improve, then I would recommend an MRI of the Lumbar spine.

## 2024-06-21 RX ORDER — ALENDRONATE SODIUM 70 MG/1
70 TABLET ORAL WEEKLY
Qty: 12 TABLET | Refills: 0 | Status: SHIPPED | OUTPATIENT
Start: 2024-06-21

## 2024-07-02 ENCOUNTER — TELEPHONE (OUTPATIENT)
Dept: PHYSICAL MEDICINE AND REHAB | Facility: CLINIC | Age: 69
End: 2024-07-02

## 2024-07-02 NOTE — TELEPHONE ENCOUNTER
Incoming call (3 way call) with [facility] and patient. Patient reached out to several physical therapy locations near her home and they do not accept her insurance. When patient called her insurance she was told that no authorization has been sent to insurance for physical therapy. Informed patient that unless she was performing her physical therapy at [facility], we do not obtain authorization. The facility who will be completing the service obtains authorization.       [facility] representative stated that he will review in network providers and once a provider has been chosen by patient, she will reach out to our office so that we may sent PT order to facility of patient's choosing.       Call was then disconnected.

## 2024-07-09 DIAGNOSIS — M79.7 FIBROMYALGIA: ICD-10-CM

## 2024-07-10 DIAGNOSIS — M47.816 FACET SYNDROME, LUMBAR: ICD-10-CM

## 2024-07-10 DIAGNOSIS — M79.10 MYALGIA: ICD-10-CM

## 2024-07-10 DIAGNOSIS — G89.29 CHRONIC LEFT-SIDED LOW BACK PAIN WITH LEFT-SIDED SCIATICA: ICD-10-CM

## 2024-07-10 DIAGNOSIS — M47.816 LUMBAR SPONDYLOSIS: ICD-10-CM

## 2024-07-10 DIAGNOSIS — M51.36 BULGE OF LUMBAR DISC WITHOUT MYELOPATHY: ICD-10-CM

## 2024-07-10 DIAGNOSIS — M51.37 DDD (DEGENERATIVE DISC DISEASE), LUMBOSACRAL: ICD-10-CM

## 2024-07-10 DIAGNOSIS — M54.59 MECHANICAL LOW BACK PAIN: ICD-10-CM

## 2024-07-10 DIAGNOSIS — M54.42 CHRONIC LEFT-SIDED LOW BACK PAIN WITH LEFT-SIDED SCIATICA: ICD-10-CM

## 2024-07-10 DIAGNOSIS — M48.061 LUMBAR FORAMINAL STENOSIS: ICD-10-CM

## 2024-07-10 DIAGNOSIS — M51.16 LUMBAR DISC HERNIATION WITH RADICULOPATHY: ICD-10-CM

## 2024-07-10 RX ORDER — CYCLOBENZAPRINE HCL 10 MG
10 TABLET ORAL NIGHTLY PRN
Qty: 90 TABLET | Refills: 2 | Status: SHIPPED | OUTPATIENT
Start: 2024-07-10

## 2024-07-10 RX ORDER — NAPROXEN 500 MG/1
500 TABLET ORAL 2 TIMES DAILY WITH MEALS
Qty: 60 TABLET | Refills: 0 | Status: SHIPPED | OUTPATIENT
Start: 2024-07-10

## 2024-07-10 NOTE — TELEPHONE ENCOUNTER
Refill Request    Medication request: naproxen (NAPROSYN) 500 MG Oral Tab  Take 1 tablet (500 mg total) by mouth 2 (two) times daily with meals.     LOV:6/10/2024 Behar, Alex, MD   Due back to clinic per last office note:  \"Return in about 2 months \"  NOV: Visit date not found      [place] 6[place]10/2024 #60    Urine drug screen (if applicable): n/a  Pain contract: n/a    LOV plan (if weaning or changing medications): Per Dr. Behar's LOV notes: \"Tylenol 500-1000 mg every 6-8 hours as needed for pain.  No more than 3000 mg daily.  Start cyclobenzaprine 5 mg 0.5-2 tablets three times per day as needed for spasms. Do not operate heavy machinery while on this medication as it may make you sleepy.    Take Naprosyn 500 mg 1 tablet twice per day with food for the next two weeks and then as needed but no more than 2 tablets per day. Do not take with any other NSAIDS (Ibuprofen, Advil, Aleve, etc.). OK to take Tylenol 500 mg every 6 hours as needed for pain. If you develop any side effects including stomach aches, nausea, vomiting, or other gastrointestinal symptoms, stop the medication and call my office.  \"      Patient has had only 1 recent dispense of Naproxen.   Hx of cardiac, no hx of GI  Ok to fill per protocol.

## 2024-07-10 NOTE — TELEPHONE ENCOUNTER
Requested Prescriptions     Pending Prescriptions Disp Refills    cyclobenzaprine 10 MG Oral Tab 90 tablet 2     Sig: Take 1 tablet (10 mg total) by mouth nightly as needed.     LAST REFILL DATE 10/12/23   QUANTITY REQUESTED    DAY SUPPLY    DIAGNOSIS    LAST OFFICE VISIT  4/10/24   FOLLOW UP DUE      Future Appointments   Date Time Provider Department Center   8/12/2024  1:00 PM Carlos Rangel MD [id number]

## 2024-07-24 ENCOUNTER — TELEPHONE (OUTPATIENT)
Dept: PHYSICAL MEDICINE AND REHAB | Facility: CLINIC | Age: 69
End: 2024-07-24

## 2024-07-24 DIAGNOSIS — G89.29 CHRONIC LEFT-SIDED LOW BACK PAIN WITH LEFT-SIDED SCIATICA: ICD-10-CM

## 2024-07-24 DIAGNOSIS — M48.061 LUMBAR FORAMINAL STENOSIS: ICD-10-CM

## 2024-07-24 DIAGNOSIS — M54.42 CHRONIC LEFT-SIDED LOW BACK PAIN WITH LEFT-SIDED SCIATICA: ICD-10-CM

## 2024-07-24 DIAGNOSIS — M47.816 LUMBAR SPONDYLOSIS: Primary | ICD-10-CM

## 2024-07-24 NOTE — TELEPHONE ENCOUNTER
Patient calling to inform Dr.Behar that she is in the process of schedule her physical therapy with [facility] but she was not able to schedule at this time because the order is dated 6/10/24 and according to their office the order is only valid for 30 days, she is requesting a date change on the PT order and fax to [facility] at [phone].

## 2024-07-24 NOTE — TELEPHONE ENCOUNTER
Order signed    Alex B. Behar MD, [facility]  Physical Medicine and Rehabilitation/Sports Medicine  [facility]

## 2024-07-31 ENCOUNTER — MED REC SCAN ONLY (OUTPATIENT)
Dept: PHYSICAL MEDICINE AND REHAB | Facility: CLINIC | Age: 69
End: 2024-07-31

## 2024-08-01 ENCOUNTER — MED REC SCAN ONLY (OUTPATIENT)
Dept: PHYSICAL MEDICINE AND REHAB | Facility: CLINIC | Age: 69
End: 2024-08-01

## 2024-08-08 DIAGNOSIS — M47.816 FACET SYNDROME, LUMBAR: ICD-10-CM

## 2024-08-08 DIAGNOSIS — M51.36 BULGE OF LUMBAR DISC WITHOUT MYELOPATHY: ICD-10-CM

## 2024-08-08 DIAGNOSIS — M54.42 CHRONIC LEFT-SIDED LOW BACK PAIN WITH LEFT-SIDED SCIATICA: ICD-10-CM

## 2024-08-08 DIAGNOSIS — M51.37 DDD (DEGENERATIVE DISC DISEASE), LUMBOSACRAL: ICD-10-CM

## 2024-08-08 DIAGNOSIS — G89.29 CHRONIC LEFT-SIDED LOW BACK PAIN WITH LEFT-SIDED SCIATICA: ICD-10-CM

## 2024-08-08 DIAGNOSIS — M47.816 LUMBAR SPONDYLOSIS: ICD-10-CM

## 2024-08-08 DIAGNOSIS — M54.59 MECHANICAL LOW BACK PAIN: ICD-10-CM

## 2024-08-08 DIAGNOSIS — M48.061 LUMBAR FORAMINAL STENOSIS: ICD-10-CM

## 2024-08-08 DIAGNOSIS — M79.10 MYALGIA: ICD-10-CM

## 2024-08-08 DIAGNOSIS — M51.16 LUMBAR DISC HERNIATION WITH RADICULOPATHY: ICD-10-CM

## 2024-08-09 RX ORDER — NAPROXEN 500 MG/1
500 TABLET ORAL 2 TIMES DAILY WITH MEALS
Qty: 60 TABLET | Refills: 0 | Status: SHIPPED | OUTPATIENT
Start: 2024-08-09

## 2024-08-09 NOTE — TELEPHONE ENCOUNTER
Refill Request    Medication request: NAPROXEN 500 MG Oral Tab.  Take 1 tablet (500 mg total) by mouth 2 (two) times daily with meals.      LOV:6/10/2024 Behar, Alex, MD   Due back to clinic per last office note:  She will follow-up with me in about 6 to 8 weeks.   NOV: Visit date not found      [place]: 7/10/24 #60 (30 days)    Urine drug screen (if applicable): N/A  Pain contract: N/A    LOV plan (if weaning or changing medications): She should continue the cyclobenzaprine and start Naprosyn twice per day.

## 2024-08-12 ENCOUNTER — OFFICE VISIT (OUTPATIENT)
Dept: INTERNAL MEDICINE CLINIC | Facility: CLINIC | Age: 69
End: 2024-08-12
Payer: MEDICARE

## 2024-08-12 VITALS
WEIGHT: 149 LBS | HEIGHT: 69 IN | SYSTOLIC BLOOD PRESSURE: 132 MMHG | OXYGEN SATURATION: 97 % | HEART RATE: 74 BPM | BODY MASS INDEX: 22.07 KG/M2 | DIASTOLIC BLOOD PRESSURE: 70 MMHG

## 2024-08-12 DIAGNOSIS — M81.8 OTHER OSTEOPOROSIS WITHOUT CURRENT PATHOLOGICAL FRACTURE: ICD-10-CM

## 2024-08-12 DIAGNOSIS — J43.2 CENTRILOBULAR EMPHYSEMA (HCC): ICD-10-CM

## 2024-08-12 DIAGNOSIS — G89.29 CHRONIC LEFT-SIDED LOW BACK PAIN WITH LEFT-SIDED SCIATICA: ICD-10-CM

## 2024-08-12 DIAGNOSIS — N32.81 OVERACTIVE BLADDER: ICD-10-CM

## 2024-08-12 DIAGNOSIS — M54.42 CHRONIC LEFT-SIDED LOW BACK PAIN WITH LEFT-SIDED SCIATICA: ICD-10-CM

## 2024-08-12 DIAGNOSIS — I70.0 ATHEROSCLEROSIS OF ABDOMINAL AORTA (HCC): ICD-10-CM

## 2024-08-12 DIAGNOSIS — M62.838 LEG MUSCLE SPASM: Primary | ICD-10-CM

## 2024-08-12 PROCEDURE — 1159F MED LIST DOCD IN RCRD: CPT | Performed by: INTERNAL MEDICINE

## 2024-08-12 PROCEDURE — 3008F BODY MASS INDEX DOCD: CPT | Performed by: INTERNAL MEDICINE

## 2024-08-12 PROCEDURE — 1160F RVW MEDS BY RX/DR IN RCRD: CPT | Performed by: INTERNAL MEDICINE

## 2024-08-12 PROCEDURE — G2211 COMPLEX E/M VISIT ADD ON: HCPCS | Performed by: INTERNAL MEDICINE

## 2024-08-12 PROCEDURE — 3078F DIAST BP <80 MM HG: CPT | Performed by: INTERNAL MEDICINE

## 2024-08-12 PROCEDURE — 99214 OFFICE O/P EST MOD 30 MIN: CPT | Performed by: INTERNAL MEDICINE

## 2024-08-12 PROCEDURE — 3075F SYST BP GE 130 - 139MM HG: CPT | Performed by: INTERNAL MEDICINE

## 2024-08-12 NOTE — PROGRESS NOTES
Taya Bruce female 68 year old         Chief Complaint   Patient presents with    COPD, DJD -  jerky movements       Now in therapy  saw  Dr Behar  -  doing  stretching  -      Had  episode sat night when lied  flat  legs started  to  jerklike getting  a reflex hammer -   went from  left  to right  -  not too bad  last night    -  last therapy  was  Thursday      She has emphysema noted on her imaging has no significant symptoms not on any inhalers.      Discussed  nasal  discharge  and  green and yellow  discharge   got  zpk      Patient Active Problem List   Diagnosis    DDD (degenerative disc disease), lumbar    Fibromyalgia    Osteoporosis    Multiple thyroid nodules    Atherosclerosis of abdominal aorta (HCC)    Raynaud's disease without gangrene    COPD, mild (HCC)          Current Outpatient Medications on File Prior to Visit   Medication Sig Dispense Refill    NAPROXEN 500 MG Oral Tab Take 1 tablet by mouth 2 times daily with meals. 60 tablet 0    cyclobenzaprine 10 MG Oral Tab Take 1 tablet (10 mg total) by mouth nightly as needed. 90 tablet 2    ALENDRONATE 70 MG Oral Tab Take 1 tablet (70 mg total) by mouth once a week. 12 tablet 0    oxybutynin ER 10 MG Oral Tablet 24 Hr Take 1 tablet (10 mg total) by mouth daily. 90 tablet 3     No current facility-administered medications on file prior to visit.          Vitals:    08/12/24 1255   BP: 132/70   Pulse: 74   VITALSBody mass index is 22 kg/m².    Pertinent findings on the physical exam; reflexs nl  cor reg  chest clear  neuro  nl     Taya was seen today for copd.    Diagnoses and all orders for this visit:    Leg muscle spasm  -     Comp Metabolic Panel (14); Future    Chronic left-sided low back pain with left-sided sciatica  -     Comp Metabolic Panel (14); Future    Atherosclerosis of abdominal aorta (HCC)  -     Lipid Panel; Future    Other osteoporosis without current pathological fracture  -     XR DEXA BONE DENSITOMETRY (CPT=77080);  Future    Overactive bladder    Centrilobular emphysema (HCC)         Will check electrolytes to see if she has any abnormalities to explain leg spasms.  I suspect it is due to recent therapy.  She does not disagree.  Not sure if muscle relaxants help.  But we will continue them for now.  She is to follow-up with Dr. Behar for her spine.    She has osteoporosis will obtain bone density.  Is currently on treatment.    Has atherosclerosis of aorta has no symptoms of peripheral arterial disease or aneurysm or mesenteric ischemia.  Currently not on aspirin or statin.  Will consider this next visit.  Will check lipid panel today.    We discussed her bladder will continue oxybutynin        This note was prepared using Dragon Medical voice recognition dictation software and as a result, errors may occur. When identified, these errors have been corrected. While every attempt is made to correct errors during dictation, discrepancies may still exist

## 2024-09-06 NOTE — TELEPHONE ENCOUNTER
MEDICATION REFILL REQUEST:    Future Appointment: 02/12/2025    Last appointment: 08/12/2024    Medication requested:   Requested Prescriptions     Pending Prescriptions Disp Refills    ALENDRONATE 70 MG Oral Tab [Pharmacy Med Name: Alendronate Sodium 70 Mg Tab Nort] 12 tablet 0     Sig: TAKE ONE TABLET BY MOUTH WEEKLY       Last refill date:    Disp Refills Start End    ALENDRONATE 70 MG Oral Tab 12 tablet 0 6/21/2024         Protocol Status:   Osteoporosis Medication Protocol Tabzpm3209/06/2024 09:53 AM   Protocol Details DEXA scan within past 2 years    CMP within the past 12 months    Calcium level between 8.3 and 10.3    GFR level greater than 35    In person appointment or virtual visit in the past 6 mos or appointment in next 3 mos

## 2024-09-08 RX ORDER — ALENDRONATE SODIUM 70 MG/1
70 TABLET ORAL WEEKLY
Qty: 12 TABLET | Refills: 3 | Status: SHIPPED | OUTPATIENT
Start: 2024-09-08

## 2024-09-09 DIAGNOSIS — M54.42 CHRONIC LEFT-SIDED LOW BACK PAIN WITH LEFT-SIDED SCIATICA: ICD-10-CM

## 2024-09-09 DIAGNOSIS — G89.29 CHRONIC LEFT-SIDED LOW BACK PAIN WITH LEFT-SIDED SCIATICA: ICD-10-CM

## 2024-09-09 DIAGNOSIS — M51.37 DDD (DEGENERATIVE DISC DISEASE), LUMBOSACRAL: ICD-10-CM

## 2024-09-09 DIAGNOSIS — M47.816 LUMBAR SPONDYLOSIS: ICD-10-CM

## 2024-09-09 DIAGNOSIS — M79.10 MYALGIA: ICD-10-CM

## 2024-09-09 DIAGNOSIS — M54.59 MECHANICAL LOW BACK PAIN: ICD-10-CM

## 2024-09-09 DIAGNOSIS — M51.16 LUMBAR DISC HERNIATION WITH RADICULOPATHY: ICD-10-CM

## 2024-09-09 DIAGNOSIS — M51.36 BULGE OF LUMBAR DISC WITHOUT MYELOPATHY: ICD-10-CM

## 2024-09-09 DIAGNOSIS — M48.061 LUMBAR FORAMINAL STENOSIS: ICD-10-CM

## 2024-09-09 DIAGNOSIS — M47.816 FACET SYNDROME, LUMBAR: ICD-10-CM

## 2024-09-09 RX ORDER — NAPROXEN 500 MG/1
500 TABLET ORAL 2 TIMES DAILY WITH MEALS
Qty: 60 TABLET | Refills: 0 | Status: SHIPPED | OUTPATIENT
Start: 2024-09-09

## 2024-09-09 NOTE — TELEPHONE ENCOUNTER
Refill Request    Medication request: NAPROXEN 500 MG TAB MOOK     LOV:6/10/2024 Behar, Alex, MD   Due back to clinic per last office note:  around 10/22/24  NOV: 10/23/2024 Behar, Alex, MD      [facility]/Last refill: 8/9/24 #60    Urine drug screen (if applicable): n/a  Pain contract: none

## 2024-09-19 ENCOUNTER — LAB ENCOUNTER (OUTPATIENT)
Dept: LAB | Age: 69
End: 2024-09-19
Attending: INTERNAL MEDICINE
Payer: MEDICARE

## 2024-09-19 ENCOUNTER — HOSPITAL ENCOUNTER (OUTPATIENT)
Dept: BONE DENSITY | Age: 69
Discharge: HOME | End: 2024-09-19
Attending: INTERNAL MEDICINE
Payer: MEDICARE

## 2024-09-19 DIAGNOSIS — M81.8 OTHER OSTEOPOROSIS WITHOUT CURRENT PATHOLOGICAL FRACTURE: ICD-10-CM

## 2024-09-19 DIAGNOSIS — I70.0 ATHEROSCLEROSIS OF ABDOMINAL AORTA (HCC): ICD-10-CM

## 2024-09-19 DIAGNOSIS — G89.29 CHRONIC LEFT-SIDED LOW BACK PAIN WITH LEFT-SIDED SCIATICA: ICD-10-CM

## 2024-09-19 DIAGNOSIS — M62.838 LEG MUSCLE SPASM: ICD-10-CM

## 2024-09-19 DIAGNOSIS — M54.42 CHRONIC LEFT-SIDED LOW BACK PAIN WITH LEFT-SIDED SCIATICA: ICD-10-CM

## 2024-09-19 LAB
ALBUMIN SERPL-MCNC: 4.3 G/DL (ref 3.2–4.8)
ALBUMIN/GLOB SERPL: 1.6 {RATIO} (ref 1–2)
ALP LIVER SERPL-CCNC: 72 U/L
ALT SERPL-CCNC: 10 U/L
ANION GAP SERPL CALC-SCNC: 7 MMOL/L (ref 0–18)
AST SERPL-CCNC: 24 U/L (ref ?–34)
BILIRUB SERPL-MCNC: 0.4 MG/DL (ref 0.2–1.1)
BUN BLD-MCNC: 12 MG/DL (ref 9–23)
BUN/CREAT SERPL: 14.1 (ref 10–20)
CALCIUM BLD-MCNC: 9.5 MG/DL (ref 8.7–10.4)
CHLORIDE SERPL-SCNC: 108 MMOL/L (ref 98–112)
CHOLEST SERPL-MCNC: 199 MG/DL (ref ?–200)
CO2 SERPL-SCNC: 27 MMOL/L (ref 21–32)
CREAT BLD-MCNC: 0.85 MG/DL
EGFRCR SERPLBLD CKD-EPI 2021: 74 ML/MIN/1.73M2 (ref 60–?)
FASTING PATIENT LIPID ANSWER: NO
FASTING STATUS PATIENT QL REPORTED: NO
GLOBULIN PLAS-MCNC: 2.7 G/DL (ref 2–3.5)
GLUCOSE BLD-MCNC: 112 MG/DL (ref 70–99)
HDLC SERPL-MCNC: 71 MG/DL (ref 40–59)
LDLC SERPL CALC-MCNC: 111 MG/DL (ref ?–100)
NONHDLC SERPL-MCNC: 128 MG/DL (ref ?–130)
OSMOLALITY SERPL CALC.SUM OF ELEC: 295 MOSM/KG (ref 275–295)
POTASSIUM SERPL-SCNC: 4.3 MMOL/L (ref 3.5–5.1)
PROT SERPL-MCNC: 7 G/DL (ref 5.7–8.2)
SODIUM SERPL-SCNC: 142 MMOL/L (ref 136–145)
TRIGL SERPL-MCNC: 93 MG/DL (ref 30–149)
VLDLC SERPL CALC-MCNC: 16 MG/DL (ref 0–30)

## 2024-09-19 PROCEDURE — 80061 LIPID PANEL: CPT

## 2024-09-19 PROCEDURE — 36415 COLL VENOUS BLD VENIPUNCTURE: CPT

## 2024-09-19 PROCEDURE — 77080 DXA BONE DENSITY AXIAL: CPT | Performed by: INTERNAL MEDICINE

## 2024-09-19 PROCEDURE — 80053 COMPREHEN METABOLIC PANEL: CPT

## 2024-09-25 ENCOUNTER — MED REC SCAN ONLY (OUTPATIENT)
Dept: PHYSICAL MEDICINE AND REHAB | Facility: CLINIC | Age: 69
End: 2024-09-25

## 2024-10-01 ENCOUNTER — MED REC SCAN ONLY (OUTPATIENT)
Dept: PHYSICAL MEDICINE AND REHAB | Facility: CLINIC | Age: 69
End: 2024-10-01

## 2024-10-07 RX ORDER — OXYBUTYNIN CHLORIDE 10 MG/1
10 TABLET, EXTENDED RELEASE ORAL DAILY
Qty: 90 TABLET | Refills: 1 | Status: SHIPPED | OUTPATIENT
Start: 2024-10-07

## 2024-10-07 RX ORDER — OXYBUTYNIN CHLORIDE 10 MG/1
10 TABLET, EXTENDED RELEASE ORAL DAILY
Qty: 90 TABLET | Refills: 0 | OUTPATIENT
Start: 2024-10-07

## 2024-10-23 ENCOUNTER — OFFICE VISIT (OUTPATIENT)
Dept: PHYSICAL MEDICINE AND REHAB | Facility: CLINIC | Age: 69
End: 2024-10-23
Payer: MEDICARE

## 2024-10-23 DIAGNOSIS — M79.10 MYALGIA: ICD-10-CM

## 2024-10-23 DIAGNOSIS — M51.369 BULGE OF LUMBAR DISC WITHOUT MYELOPATHY: ICD-10-CM

## 2024-10-23 DIAGNOSIS — M54.42 CHRONIC LEFT-SIDED LOW BACK PAIN WITH LEFT-SIDED SCIATICA: Primary | ICD-10-CM

## 2024-10-23 DIAGNOSIS — M51.372 DEGENERATION OF INTERVERTEBRAL DISC OF LUMBOSACRAL REGION WITH DISCOGENIC BACK PAIN AND LOWER EXTREMITY PAIN: ICD-10-CM

## 2024-10-23 DIAGNOSIS — M48.061 LUMBAR FORAMINAL STENOSIS: ICD-10-CM

## 2024-10-23 DIAGNOSIS — M47.816 FACET SYNDROME, LUMBAR: ICD-10-CM

## 2024-10-23 DIAGNOSIS — G89.29 CHRONIC LEFT-SIDED LOW BACK PAIN WITH LEFT-SIDED SCIATICA: Primary | ICD-10-CM

## 2024-10-23 DIAGNOSIS — M54.59 MECHANICAL LOW BACK PAIN: ICD-10-CM

## 2024-10-23 DIAGNOSIS — M81.0 AGE-RELATED OSTEOPOROSIS WITHOUT CURRENT PATHOLOGICAL FRACTURE: ICD-10-CM

## 2024-10-23 DIAGNOSIS — M51.16 LUMBAR DISC HERNIATION WITH RADICULOPATHY: ICD-10-CM

## 2024-10-23 DIAGNOSIS — M47.816 LUMBAR SPONDYLOSIS: ICD-10-CM

## 2024-10-23 PROCEDURE — 99214 OFFICE O/P EST MOD 30 MIN: CPT | Performed by: PHYSICAL MEDICINE & REHABILITATION

## 2024-10-23 PROCEDURE — 1159F MED LIST DOCD IN RCRD: CPT | Performed by: PHYSICAL MEDICINE & REHABILITATION

## 2024-10-23 PROCEDURE — 1160F RVW MEDS BY RX/DR IN RCRD: CPT | Performed by: PHYSICAL MEDICINE & REHABILITATION

## 2024-10-23 NOTE — PATIENT INSTRUCTIONS
1) Tylenol 500-1000 mg every 6-8 hours as needed for pain.  No more than 3000 mg daily.  2) Start cyclobenzaprine 5 mg 0.5-2 tablets three times per day as needed for spasms. Do not operate heavy machinery while on this medication as it may make you sleepy.   3) Please call and schedule your MRI of the Lumbar spine at [phone].  Once you have your MRI scheduled, then call my office again to schedule a follow-up visit soon after your MRI so we may review the images together.

## 2024-10-23 NOTE — PROGRESS NOTES
[facility]  Progress Note    CHIEF COMPLAINT:    Chief Complaint   Patient presents with    Follow - Up     LOV: 06/10/2024. Pt reports compliance with PT/HEP pt reports compliance with HEP/PT until last Friday when she started feeling there LLE was becoming weaker. PT has adjusted their technique since per pt report. Pain to Left low back radiating posterior (tightness)/lateral/anterior. Intermittent numbness to LEFT thigh. CPL: 4/10.  XR completed 06/10/2024. Pt brought CD of lumbar & cervical MRI from May 1st 2019.  Naproxen can't take d/t SE. Tylenol, arnicare w/ slight relief.       History of Present Illness:  The patient is a 69 year old  right-handed female with significant past medical history of osteoporosis  who presents for follow up of their low back pain with radiation to the left anteromedial thigh and left hamstring/gastroc region. She rates her pain a 4/10. She denies numbness and tingling which has improved. She has been taking Flexeril nightly which helps. She tried Naproxen but this caused stomach upset. She brought in her MRI of the cervical and lumbar spine from .     PAST MEDICAL HISTORY:  Past Medical History:    Fibromyalgia    Osteoporosis    PONV (postoperative nausea and vomiting)       SURGICAL HISTORY:  Past Surgical History:   Procedure Laterality Date    Hysterectomy         SOCIAL HISTORY:   Social History     Occupational History    Not on file   Tobacco Use    Smoking status: Former     Current packs/day: 0.00     Types: Cigarettes     Quit date: 2023     Years since quittin.7    Smokeless tobacco: Never    Tobacco comments:     I quit for 9 years, went back to smoking    Substance and Sexual Activity    Alcohol use: Not Currently    Drug use: Never    Sexual activity: Not on file       FAMILY HISTORY:   Family History   Problem Relation Age of Onset    Hypertension Father     Stroke Father     Dementia Mother      Cancer Maternal Uncle     Heart Disease Brother         Coronary heart disease, hypertension    Hypertension Sister     Hypertension Brother     Hypertension Brother         Hypertension       CURRENT MEDICATIONS:   Current Outpatient Medications   Medication Sig Dispense Refill    oxybutynin ER 10 MG Oral Tablet 24 Hr Take 1 tablet (10 mg total) by mouth daily. 90 tablet 1    alendronate 70 MG Oral Tab Take 1 tablet (70 mg total) by mouth once a week. 12 tablet 3    cyclobenzaprine 10 MG Oral Tab Take 1 tablet (10 mg total) by mouth nightly as needed. 90 tablet 2       ALLERGIES:   Allergies[1]    REVIEW OF SYSTEMS:   Review of Systems   Constitutional: Negative.    HENT: Negative.    Eyes: Negative.    Respiratory: Negative.    Cardiovascular: Negative.    Gastrointestinal: Negative.    Genitourinary: Negative.    Musculoskeletal: As per HPI  Skin: Negative.    Neurological: As per HPI  Endo/Heme/Allergies: Negative.    Psychiatric/Behavioral: Negative.      All other systems reviewed and are negative. Pertinent positives and negatives noted in the HPI.        PHYSICAL EXAM:   There were no vitals taken for this visit.    There is no height or weight on file to calculate BMI.      General: No immediate distress  Head: Normocephalic/ Atraumatic  Eyes: Extra-occular movements intact.   Ears: No auricular hematoma or deformities  Mouth: No lesions or ulcerations  Heart: peripheral pulses intact. Normal capillary refill.   Lungs: Non-labored respirations  Abdomen: No abdominal guarding  Extremities: No lower extremity edema bilaterally   Skin: No lesions noted.   Cognition: alert & oriented x 3, attentive, able to follow 2 step commands, comprehention intact, spontaneous speech intact  Motor:    Musculoskeletal:    LUMBAR SPINE:  Inspection: no erythema, swelling, or obvious deformity.  Their iliac crest and shoulder heights are symmetrical.  Postural exam reveals no scoliosis or kyphosis.  Palpation: To palpation  midline lumbar spine as well as bilateral lower lumbar facets and paraspinals, bilateral glutes and piriformis, bilateral greater trochanter and IT band  ROM: Full active range of motion of the lumbar spine with pain during flexion and extension  Motor: 5/5 in all myotomes of the BILATERAL lower extremities except 4 out of 5 during left great toe extension and plantarflexion  Sensation: Intact to light touch in all dermatomes of the lower extremities except decrease sensation to light touch of the left L3, L4, L5, and S1 dermatomes  Reflexes: 2/4 at L4 and S1 except 1/4 at left S1  Facet Loading: Positive bilaterally for axial low back pain  TYREL: Negative  Slump test: Positive on the left for radicular symptoms down the left leg  Gait:  Normal    Data  [facility] Lab Encounter on 09/19/2024   Component Date Value Ref Range Status    Glucose 09/19/2024 112 (H)  70 - 99 mg/dL Final    Sodium 09/19/2024 142  136 - 145 mmol/L Final    Potassium 09/19/2024 4.3  3.5 - 5.1 mmol/L Final    Chloride 09/19/2024 108  98 - 112 mmol/L Final    CO2 09/19/2024 27.0  21.0 - 32.0 mmol/L Final    Anion Gap 09/19/2024 7  0 - 18 mmol/L Final    BUN 09/19/2024 12  9 - 23 mg/dL Final    Creatinine 09/19/2024 0.85  0.55 - 1.02 mg/dL Final    BUN/CREA Ratio 09/19/2024 14.1  10.0 - 20.0 Final    Calcium, Total 09/19/2024 9.5  8.7 - 10.4 mg/dL Final    Calculated Osmolality 09/19/2024 295  275 - 295 mOsm/kg Final    eGFR-Cr 09/19/2024 74  >=60 mL/min/1.73m2 Final    ALT 09/19/2024 10  10 - 49 U/L Final    AST 09/19/2024 24  <34 U/L Final    Alkaline Phosphatase 09/19/2024 72  55 - 142 U/L Final    Bilirubin, Total 09/19/2024 0.4  0.2 - 1.1 mg/dL Final    Total Protein 09/19/2024 7.0  5.7 - 8.2 g/dL Final    Albumin 09/19/2024 4.3  3.2 - 4.8 g/dL Final    Globulin  09/19/2024 2.7  2.0 - 3.5 g/dL Final    A/G Ratio 09/19/2024 1.6  1.0 - 2.0 Final    Patient Fasting for CMP? 09/19/2024 No   Final    Cholesterol, Total 09/19/2024 199  <200 mg/dL  Final    HDL Cholesterol 09/19/2024 71 (H)  40 - 59 mg/dL Final    Triglycerides 09/19/2024 93  30 - 149 mg/dL Final    LDL Cholesterol 09/19/2024 111 (H)  <100 mg/dL Final    VLDL 09/19/2024 16  0 - 30 mg/dL Final    Non HDL Chol 09/19/2024 128  <130 mg/dL Final    Patient Fasting for Lipid? 09/19/2024 No   Final   ]      Radiology Imaging:  I reviewed with the patient her X-ray and Dexa Scan of the lumbar spine  XR DEXA BONE DENSITOMETRY (CPT=77080)  Narrative: PROCEDURE: XR DEXA BONE DENSITOMETRY (CPT=77080)     COMPARISON: [facility], XR DEXA BONE DENSITOMETRY (CPT=77080), 1/05/2022, 1:24 PM.     INDICATIONS: Other osteoporosis without current pathological fracture     TECHNIQUE: Measurement of bone mineral density of the lumbar spine and hip was performed on a Hologic dual energy x-ray absorptiometry scanner.     FINDINGS:      LEFT FEMORAL NECK   BMD: 0.554 gm/sq. cm. T SCORE: -2.7 Z SCORE: -0.9     LEFT TOTAL HIP   BMD: 0.7 findings 0 gm/sq. cm. T SCORE: -1.6 Z SCORE: -0.1     PA LUMBAR SPINE (L1 - L4)   BMD: 1.070 gm/sq. cm. T SCORE: 0.2 Z SCORE: 2.3         T scores are a comparison to sex-matched patients with mean peak bone mass and are given in standard deviation (s.d.).  Each 1 s.d. corresponds to approximately 10% below peak normal bone density.       WORLD HE[employer] ORGANIZATION CRITERIA  NORMAL T SCORE: Above -1 s.d.    OSTEOPENIA T SCORE: Between -1 and -2.5 s.d.    OSTEOPOROSIS T SCORE: -2.5 s.d.     National Osteoporosis Foundation Clinician's Guide to Prevention and Treatment of Osteoporosis recommendations for treatment:  Post menopausal women and men age 50 and older presenting with the following should be considered for treatment:  * A hip or vertebral (clinical or morphometric) fracture  * T score < -2.5 at the femoral neck or spine after appropriate evaluation to exclude secondary causes.  * Low bone mass (T score between -1.0 and -2.5 at the femoral neck or spine) and  a 10-year probability of a hip fracture > 3% or a 10-year probability of a major osteoporosis-related fracture > 20% based on the [place]-adapted WHO algorithm              Impression: CONCLUSION:   1. Findings in the left femoral neck suggest osteoporosis, and there is increased fracture risk.  There has been increase in the BMD by 0.9% from previous study.  Findings in the total left hip suggest osteopenia, there may be increased fracture risk.    There has been increase in the BMD by 1.8% from previous study.  FRAX cannot be computed because the patient is currently on medical therapy for treatment of osteoporosis.  2. Findings in the total AP lumbar spine are in the normal range, and there is no increased fracture risk.  There has been increase in the BMD by 5.9% from previous study.           Dictated by (CST): Ryder Arteaga MD on 9/19/2024 at 5:34 PM       Finalized by (CST): Ryder Arteaga MD on 9/19/2024 at 5:35 PM            PROCEDURE: XR LUMBAR SPINE (MIN 4 VIEWS) (CPT=72110)     COMPARISON: None available.     INDICATIONS: Chronic general lumbar pain with lumbar radiculopathy radiating down left side. No known precipitating antecedent injury.     TECHNIQUE: Lumbar spine radiographs (minimum 4 views)       FINDINGS:  ALIGNMENT:   The anatomic lumbar lordosis is preserved.  VERTEBRAL BODIES:   A total of 5 non-rib-bearing lumbar-type vertebral bodies are identified. No fracture, subluxation, or bony lesion is visible. Multilevel anterior osteophyte formation is demonstrated. 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  DISC SPACES: Intervertebral disc space narrowing is most pronounced at the L4-L5 level with associated vacuum disc phenomenon.  SACROILIAC JOINTS: Unremarkable.    OBLIQUE VIEWS: No defects of the pars interarticularis are identified. There is minimal facet arthrosis of the lower lumbar levels.  OTHER: Surgical clips project over the right upper quadrant, likely from prior  cholecystectomy. Heavy atherosclerotic vascular calcifications of the abdominal aorta are observed.                  Impression   CONCLUSION:  1. Multilevel degenerative changes of the lumbar spine, most notably at L4-L5.     2. No radiographically visible acute osseous injury of the lumbar spine.           Dictated by (CST): Carl Modi MD on 6/10/2024 at 7:50 PM      Finalized by (CST): Carl Modi MD on 6/10/2024 at 7:51 PM      ASSESSMENT AND PLAN:  Taya is a pleasant 69-year-old female presents for follow-up of her bilateral low back pain with radiation into the left hamstring and anterior thigh.  I have reviewed her x-rays of the lumbar spine.  I have also reviewed her most recent DEXA scan and compared to the one from 2022 with some improvement in her osteoporosis.  I am recommending an MRI of the lumbar spine to further investigate her symptoms as she has already completed a full course of physical therapy.  I have reviewed those notes as well.  She should continue the Tylenol as well as Flexeril.  She will call me to schedule a follow-up and review her MRI images and at that point we can consider neck steps.       RTC in 2 to 4 weeks for MRI review  Discharge Instructions were provided as documented in AVS summary.  The patient was in agreement with the assessment and plan.  All questions were answered.  There were no barriers to learning.         1. Chronic left-sided low back pain with left-sided sciatica    2. Myalgia    3. Facet syndrome, lumbar    4. Mechanical low back pain    5. Lumbar spondylosis    6. Degeneration of intervertebral disc of lumbosacral region with discogenic back pain and lower extremity pain    7. Bulge of lumbar disc without myelopathy    8. Lumbar foraminal stenosis    9. Lumbar disc herniation with radiculopathy    10. Age-related osteoporosis without current pathological fracture        Alex B. Behar MD  Physical Medicine and Rehabilitation/Sports Medicine  [facility] [place]         [1]   Allergies  Allergen Reactions    Strawberry HIVES     strawberries    Penicillin V UNKNOWN

## 2024-10-24 ENCOUNTER — TELEPHONE (OUTPATIENT)
Dept: PHYSICAL MEDICINE AND REHAB | Facility: CLINIC | Age: 69
End: 2024-10-24

## 2024-10-24 ENCOUNTER — TELEPHONE (OUTPATIENT)
Dept: INTERNAL MEDICINE CLINIC | Facility: CLINIC | Age: 69
End: 2024-10-24

## 2024-10-24 DIAGNOSIS — M79.10 MYALGIA: Primary | ICD-10-CM

## 2024-10-24 DIAGNOSIS — M51.369 BULGE OF LUMBAR DISC WITHOUT MYELOPATHY: ICD-10-CM

## 2024-10-24 DIAGNOSIS — M51.372 DEGENERATION OF INTERVERTEBRAL DISC OF LUMBOSACRAL REGION WITH DISCOGENIC BACK PAIN AND LOWER EXTREMITY PAIN: ICD-10-CM

## 2024-10-24 DIAGNOSIS — M48.061 LUMBAR FORAMINAL STENOSIS: ICD-10-CM

## 2024-10-24 DIAGNOSIS — M54.42 CHRONIC LEFT-SIDED LOW BACK PAIN WITH LEFT-SIDED SCIATICA: ICD-10-CM

## 2024-10-24 DIAGNOSIS — G89.29 CHRONIC LEFT-SIDED LOW BACK PAIN WITH LEFT-SIDED SCIATICA: ICD-10-CM

## 2024-10-24 DIAGNOSIS — M47.816 LUMBAR SPONDYLOSIS: ICD-10-CM

## 2024-10-24 DIAGNOSIS — M47.816 FACET SYNDROME, LUMBAR: ICD-10-CM

## 2024-10-24 DIAGNOSIS — M51.16 LUMBAR DISC HERNIATION WITH RADICULOPATHY: ICD-10-CM

## 2024-10-24 DIAGNOSIS — M54.59 MECHANICAL LOW BACK PAIN: ICD-10-CM

## 2024-10-24 DIAGNOSIS — M54.42 CHRONIC LEFT-SIDED LOW BACK PAIN WITH LEFT-SIDED SCIATICA: Primary | ICD-10-CM

## 2024-10-24 DIAGNOSIS — G89.29 CHRONIC LEFT-SIDED LOW BACK PAIN WITH LEFT-SIDED SCIATICA: Primary | ICD-10-CM

## 2024-10-24 RX ORDER — CYCLOBENZAPRINE HCL 5 MG
TABLET ORAL
Qty: 90 TABLET | Refills: 0 | Status: SHIPPED | OUTPATIENT
Start: 2024-10-24

## 2024-10-24 NOTE — TELEPHONE ENCOUNTER
Incoming call from patient regarding cyclobenzaprine order. Pt would like to take the way Dr. Behar recommended but will run out of her previous flexeril order.     Order placed for flexeril per Dr. Behar's protocol.       Pt inquiring regarding physical therapy. Pt informed this RN that she has remaining sessions scheduled, this would be the 2nd or 3rd order that she would be completing. Informed pt that her Dr. Behar's LOV note, it appeared as though he had thought her PT was completed, so if patient does not find PT to be helpful, she may cancel her remaining sessions, if she does find it to be helpful, she can finish out the sessions she has remaining. Pt verbalized understanding and was thankful.

## 2024-10-24 NOTE — TELEPHONE ENCOUNTER
Patient requesting referral for BeharNiko pearce MD. Per patient notify via mychart when submitted. Thanks

## 2024-10-25 NOTE — TELEPHONE ENCOUNTER
Since our office does not have [employer] provider list aPhysiatry external referral for Dr. Behar has been entered.     [facility] notification sent to [facility]

## 2024-10-29 ENCOUNTER — HOSPITAL ENCOUNTER (OUTPATIENT)
Dept: MRI IMAGING | Age: 69
Discharge: HOME | End: 2024-10-29
Attending: PHYSICAL MEDICINE & REHABILITATION
Payer: MEDICARE

## 2024-10-29 DIAGNOSIS — M79.10 MYALGIA: ICD-10-CM

## 2024-10-29 DIAGNOSIS — M54.42 CHRONIC LEFT-SIDED LOW BACK PAIN WITH LEFT-SIDED SCIATICA: ICD-10-CM

## 2024-10-29 DIAGNOSIS — M54.59 MECHANICAL LOW BACK PAIN: ICD-10-CM

## 2024-10-29 DIAGNOSIS — M51.372 DEGENERATION OF INTERVERTEBRAL DISC OF LUMBOSACRAL REGION WITH DISCOGENIC BACK PAIN AND LOWER EXTREMITY PAIN: ICD-10-CM

## 2024-10-29 DIAGNOSIS — M51.16 LUMBAR DISC HERNIATION WITH RADICULOPATHY: ICD-10-CM

## 2024-10-29 DIAGNOSIS — M47.816 FACET SYNDROME, LUMBAR: ICD-10-CM

## 2024-10-29 DIAGNOSIS — M51.369 BULGE OF LUMBAR DISC WITHOUT MYELOPATHY: ICD-10-CM

## 2024-10-29 DIAGNOSIS — M81.0 AGE-RELATED OSTEOPOROSIS WITHOUT CURRENT PATHOLOGICAL FRACTURE: ICD-10-CM

## 2024-10-29 DIAGNOSIS — M47.816 LUMBAR SPONDYLOSIS: ICD-10-CM

## 2024-10-29 DIAGNOSIS — M48.061 LUMBAR FORAMINAL STENOSIS: ICD-10-CM

## 2024-10-29 DIAGNOSIS — G89.29 CHRONIC LEFT-SIDED LOW BACK PAIN WITH LEFT-SIDED SCIATICA: ICD-10-CM

## 2024-10-29 PROCEDURE — 72148 MRI LUMBAR SPINE W/O DYE: CPT | Performed by: PHYSICAL MEDICINE & REHABILITATION

## 2024-10-30 ENCOUNTER — TELEPHONE (OUTPATIENT)
Dept: PHYSICAL MEDICINE AND REHAB | Facility: CLINIC | Age: 69
End: 2024-10-30

## 2024-10-30 NOTE — TELEPHONE ENCOUNTER
This RN s/w patient and advised to come in on Monday 11/04/2024 at 0900 which she accepted.    This RN had slot opened up and scheduled patient.  Patient was advised she may need to wait a a little bit, as we are getting her squeezed in to be seen sooner and patient was agreeable and appreciative.

## 2024-10-30 NOTE — TELEPHONE ENCOUNTER
Patient states that Dr. Behar advised her to call once MRI done and speak to nursing to get an appointment for him to go over her MRI results with her.     This RN advised aside from virtual appointments there wasn't much that she saw to squeeze in for an in-person appt so would need to ask Dr. Behar where he feels would be a good time to bring her in.      Patient reports that Dr. Behar wanted to sit side by side with her to show her the results and go over everything.     This RN confirmed back patient's call back number and will discuss with Dr. Behar.

## 2024-11-04 ENCOUNTER — OFFICE VISIT (OUTPATIENT)
Dept: PHYSICAL MEDICINE AND REHAB | Facility: CLINIC | Age: 69
End: 2024-11-04
Payer: MEDICARE

## 2024-11-04 VITALS — WEIGHT: 149 LBS | BODY MASS INDEX: 22.07 KG/M2 | HEIGHT: 69 IN

## 2024-11-04 DIAGNOSIS — M47.816 LUMBAR SPONDYLOSIS: ICD-10-CM

## 2024-11-04 DIAGNOSIS — M51.16 LUMBAR DISC HERNIATION WITH RADICULOPATHY: ICD-10-CM

## 2024-11-04 DIAGNOSIS — M48.061 LUMBAR FORAMINAL STENOSIS: ICD-10-CM

## 2024-11-04 DIAGNOSIS — M51.369 BULGE OF LUMBAR DISC WITHOUT MYELOPATHY: ICD-10-CM

## 2024-11-04 DIAGNOSIS — M54.59 MECHANICAL LOW BACK PAIN: ICD-10-CM

## 2024-11-04 DIAGNOSIS — M54.42 CHRONIC LEFT-SIDED LOW BACK PAIN WITH LEFT-SIDED SCIATICA: ICD-10-CM

## 2024-11-04 DIAGNOSIS — M51.372 DEGENERATION OF INTERVERTEBRAL DISC OF LUMBOSACRAL REGION WITH DISCOGENIC BACK PAIN AND LOWER EXTREMITY PAIN: ICD-10-CM

## 2024-11-04 DIAGNOSIS — G89.29 CHRONIC LEFT-SIDED LOW BACK PAIN WITH LEFT-SIDED SCIATICA: ICD-10-CM

## 2024-11-04 DIAGNOSIS — M79.10 MYALGIA: Primary | ICD-10-CM

## 2024-11-04 DIAGNOSIS — M81.0 AGE-RELATED OSTEOPOROSIS WITHOUT CURRENT PATHOLOGICAL FRACTURE: ICD-10-CM

## 2024-11-04 DIAGNOSIS — M47.816 FACET SYNDROME, LUMBAR: ICD-10-CM

## 2024-11-04 PROCEDURE — 3008F BODY MASS INDEX DOCD: CPT | Performed by: PHYSICAL MEDICINE & REHABILITATION

## 2024-11-04 PROCEDURE — 1159F MED LIST DOCD IN RCRD: CPT | Performed by: PHYSICAL MEDICINE & REHABILITATION

## 2024-11-04 PROCEDURE — 99214 OFFICE O/P EST MOD 30 MIN: CPT | Performed by: PHYSICAL MEDICINE & REHABILITATION

## 2024-11-04 PROCEDURE — 1160F RVW MEDS BY RX/DR IN RCRD: CPT | Performed by: PHYSICAL MEDICINE & REHABILITATION

## 2024-11-04 RX ORDER — GABAPENTIN 100 MG/1
100 CAPSULE ORAL NIGHTLY
Qty: 90 CAPSULE | Refills: 0 | Status: SHIPPED | OUTPATIENT
Start: 2024-11-04

## 2024-11-04 NOTE — PATIENT INSTRUCTIONS
1) Start gabapentin 100 mg nightly.  2) We can potentially increase the gabapentin to 100 mg three times per day but would like to see how you react to it first  3) Continue Flexeril 5 mg twice per day. If you are too sleepy, then would recommend cutting the night time Flexeril I  half.  4) Continue home exercise program  5) If osteoporosis is better controlled, we can consider a LEFt L5 and LEFt S1 TFESI (transitional anatomy)  6) Follow up with me in about 2 months   7) Speak with Dr. Rangel regarding the kidney cysts seen on your MRI  8) Make a separate appointment for the neck and can be sooner than 2 months

## 2024-11-05 NOTE — PROGRESS NOTES
[facility]  Progress Note    CHIEF COMPLAINT:    Chief Complaint   Patient presents with    Follow - Up     LOV: 2024 pt comes in to f/u on MRI of L-spine. Presents with midline low back and L hamstring pain. Denies T/N. Admits weakness. Rates pain 5/10. Takes Flexeril 10mg BID.       History of Present Illness:  The patient is a 69 year old right-handed female with significant past medical history of osteoporosis  who presents for follow up of their low back pain with radiation to the left anteromedial thigh and left hamstring/gastroc region.  She rates her discomfort about a 5 out of 10.  She has been taking Flexeril 10 mg nightly.  She did have an MRI of the lumbar spine performed which I independently reviewed.    PAST MEDICAL HISTORY:  Past Medical History:    Fibromyalgia    Osteoporosis    PONV (postoperative nausea and vomiting)       SURGICAL HISTORY:  Past Surgical History:   Procedure Laterality Date    Hysterectomy         SOCIAL HISTORY:   Social History     Occupational History    Not on file   Tobacco Use    Smoking status: Former     Current packs/day: 0.00     Types: Cigarettes     Quit date: 2023     Years since quittin.7    Smokeless tobacco: Never    Tobacco comments:     I quit for 9 years, went back to smoking    Substance and Sexual Activity    Alcohol use: Not Currently    Drug use: Never    Sexual activity: Not on file       FAMILY HISTORY:   Family History   Problem Relation Age of Onset    Hypertension Father     Stroke Father     Dementia Mother     Cancer Maternal Uncle     Heart Disease Brother         Coronary heart disease, hypertension    Hypertension Sister     Hypertension Brother     Hypertension Brother         Hypertension       CURRENT MEDICATIONS:   Current Outpatient Medications   Medication Sig Dispense Refill    gabapentin 100 MG Oral Cap Take 1 capsule (100 mg total) by mouth nightly. 90 capsule 0     cyclobenzaprine 5 MG Oral Tab 5 mg 0.5-2 tablets three times per day as needed for spasms. Do not operate heavy machinery while on this medication as it may make you sleepy 90 tablet 0    oxybutynin ER 10 MG Oral Tablet 24 Hr Take 1 tablet (10 mg total) by mouth daily. 90 tablet 1    alendronate 70 MG Oral Tab Take 1 tablet (70 mg total) by mouth once a week. 12 tablet 3       ALLERGIES:   Allergies[1]    REVIEW OF SYSTEMS:   Review of Systems   Constitutional: Negative.    HENT: Negative.    Eyes: Negative.    Respiratory: Negative.    Cardiovascular: Negative.    Gastrointestinal: Negative.    Genitourinary: Negative.    Musculoskeletal: As per HPI  Skin: Negative.    Neurological: As per HPI  Endo/Heme/Allergies: Negative.    Psychiatric/Behavioral: Negative.      All other systems reviewed and are negative. Pertinent positives and negatives noted in the HPI.        PHYSICAL EXAM:   Ht 69\"   Wt 149 lb (67.6 kg)   BMI 22.00 kg/m²     Body mass index is 22 kg/m².      General: No immediate distress  Head: Normocephalic/ Atraumatic  Eyes: Extra-occular movements intact.   Ears: No auricular hematoma or deformities  Mouth: No lesions or ulcerations  Heart: peripheral pulses intact. Normal capillary refill.   Lungs: Non-labored respirations  Abdomen: No abdominal guarding  Extremities: No lower extremity edema bilaterally   Skin: No lesions noted.   Cognition: alert & oriented x 3, attentive, able to follow 2 step commands, comprehention intact, spontaneous speech intact  Motor:    Musculoskeletal:     LUMBAR SPINE:  Inspection: no erythema, swelling, or obvious deformity.  Their iliac crest and shoulder heights are symmetrical.  Postural exam reveals no scoliosis or kyphosis.  Palpation: To palpation midline lumbar spine as well as bilateral lower lumbar facets and paraspinals, bilateral glutes and piriformis, bilateral greater trochanter and IT band  ROM: Full active range of motion of the lumbar spine with pain  during flexion and extension  Motor: 5/5 in all myotomes of the BILATERAL lower extremities except 4 out of 5 during left great toe extension and plantarflexion  Sensation: Intact to light touch in all dermatomes of the lower extremities except decrease sensation to light touch of the left L3, L4, L5, and S1 dermatomes  Reflexes: 2/4 at L4 and S1 except 1/4 at left S1  Facet Loading: Positive bilaterally for axial low back pain  TYREL: Negative  Slump test: Positive on the left for radicular symptoms down the left leg  Gait:  Normal    Data  [facility] Lab Encounter on 09/19/2024   Component Date Value Ref Range Status    Glucose 09/19/2024 112 (H)  70 - 99 mg/dL Final    Sodium 09/19/2024 142  136 - 145 mmol/L Final    Potassium 09/19/2024 4.3  3.5 - 5.1 mmol/L Final    Chloride 09/19/2024 108  98 - 112 mmol/L Final    CO2 09/19/2024 27.0  21.0 - 32.0 mmol/L Final    Anion Gap 09/19/2024 7  0 - 18 mmol/L Final    BUN 09/19/2024 12  9 - 23 mg/dL Final    Creatinine 09/19/2024 0.85  0.55 - 1.02 mg/dL Final    BUN/CREA Ratio 09/19/2024 14.1  10.0 - 20.0 Final    Calcium, Total 09/19/2024 9.5  8.7 - 10.4 mg/dL Final    Calculated Osmolality 09/19/2024 295  275 - 295 mOsm/kg Final    eGFR-Cr 09/19/2024 74  >=60 mL/min/1.73m2 Final    ALT 09/19/2024 10  10 - 49 U/L Final    AST 09/19/2024 24  <34 U/L Final    Alkaline Phosphatase 09/19/2024 72  55 - 142 U/L Final    Bilirubin, Total 09/19/2024 0.4  0.2 - 1.1 mg/dL Final    Total Protein 09/19/2024 7.0  5.7 - 8.2 g/dL Final    Albumin 09/19/2024 4.3  3.2 - 4.8 g/dL Final    Globulin  09/19/2024 2.7  2.0 - 3.5 g/dL Final    A/G Ratio 09/19/2024 1.6  1.0 - 2.0 Final    Patient Fasting for CMP? 09/19/2024 No   Final    Cholesterol, Total 09/19/2024 199  <200 mg/dL Final    HDL Cholesterol 09/19/2024 71 (H)  40 - 59 mg/dL Final    Triglycerides 09/19/2024 93  30 - 149 mg/dL Final    LDL Cholesterol 09/19/2024 111 (H)  <100 mg/dL Final    VLDL 09/19/2024 16  0 - 30 mg/dL Final    Non  HDL Chol 09/19/2024 128  <130 mg/dL Final    Patient Fasting for Lipid? 09/19/2024 No   Final   ]      Radiology Imaging:  I reviewed with the patient her MRI of the lumbar spine from 10/29/2024  MRI SPINE LUMBAR (CPT=72148)  Narrative: PROCEDURE:  MRI SPINE LUMBAR (CPT=72148)     COMPARISON:  None.     INDICATIONS:  M81.0 Age-related osteoporosis without current pathological fracture M51.16 Lumbar disc herniation with radiculopathy M48.061 Lumbar foraminal stenosis M51.369*     TECHNIQUE:  Multiplanar T1 and T2 weighted images including fat suppression sequences.  Images acquired in sagittal and axial planes.       PATIENT STATED HISTORY: (As transcribed by Technologist)  Patient has chronic low back pain that is radiating down the left leg to foot.  She stated pain is getting worse.         FINDINGS:  Alignment of the lumbar spine is normal.  Vertebral body heights are maintained throughout the lumbar spine.  Moderate loss of the L5-S1 disc space with endplate osteoarthritic changes.  For numbering purposes there is lumbarization of S1.  Marrow signal is unremarkable.  The conus is at L1-L2.   The visualized portion of the spinal cord is of normal caliber without focal signal abnormality.  T12-L1: No disc herniation, spinal canal or neuroforaminal stenosis.  L1-L2: No disc herniation or spinal canal or neuroforaminal stenosis.  L2-L3: No disc herniation, spinal canal or neuroforaminal stenosis.  L3-L4: No disc herniation, spinal canal or neuroforaminal stenosis.  L4-L5:  Minimal disc bulge with ligamentum flavum thickening facet hypertrophic changes is noted.  Mild right neural foraminal stenosis and mild spinal canal stenosis.  L5-S1:  Minimal disc bulge without spinal canal stenosis.  No neural foraminal stenosis.                   Impression: CONCLUSION:       1. For numbering purposes there is lumbarization of S1.  The most superior partially visualized vertebral body on sagittal sequences will be considered  T11.     2. Mild osteoarthritic changes in the lower lumbar spine.          LOCATION:  [facility]        Dictated by (CST): Sherman Marquez MD on 10/29/2024 at 2:10 PM       Finalized by (CST): Sherman Marquez MD on 10/29/2024 at 2:14 PM         ASSESSMENT AND PLAN:  Taya is a pleasant 69-year-old female presents for follow-up and MRI review of her lumbar spine.  She continues to have discomfort radiating into the hamstring region and into the gastroc.  Her MRI does demonstrate degenerative changes most notably at L4-L5 and L5-S1 with a rudimentary disc at S1-S2.  We have discussed her condition and possible treatment options.  I am recommending she start gabapentin 100 mg nightly and can increase this if needed.  She should continue her Flexeril but I recommended she bring this down to 5 mg twice per day as she is concerned with how sedating gabapentin may make her.  She should continue her home exercise program and follow-up with me in about 2 months.  If the osteoporosis is better controlled, we can consider a left L5 and left S1 transforaminal epidural steroid injection with consideration of the transitional anatomy.  She should speak with Dr. Rangel regarding her kidney cysts seen on MRI.  Separately, she would like to see me for her cervical spine.  She will call and schedule an appointment for this.       RTC in 2 months for the lumbar spine  Discharge Instructions were provided as documented in AVS summary.  The patient was in agreement with the assessment and plan.  All questions were answered.  There were no barriers to learning.         1. Myalgia    2. Chronic left-sided low back pain with left-sided sciatica    3. Facet syndrome, lumbar    4. Mechanical low back pain    5. Lumbar spondylosis    6. Degeneration of intervertebral disc of lumbosacral region with discogenic back pain and lower extremity pain    7. Bulge of lumbar disc without myelopathy    8. Lumbar foraminal stenosis    9. Lumbar disc  herniation with radiculopathy    10. Age-related osteoporosis without current pathological fracture        Alex B. Behar MD  Physical Medicine and Rehabilitation/Sports Medicine  [facility]         [1]   Allergies  Allergen Reactions    Strawberry HIVES     strawberries    Penicillin V UNKNOWN

## 2024-12-27 ENCOUNTER — TELEPHONE (OUTPATIENT)
Dept: INTERNAL MEDICINE CLINIC | Facility: CLINIC | Age: 69
End: 2024-12-27

## 2024-12-27 DIAGNOSIS — G89.29 CHRONIC LEFT-SIDED LOW BACK PAIN WITH SCIATICA, SCIATICA LATERALITY UNSPECIFIED: Primary | ICD-10-CM

## 2024-12-27 DIAGNOSIS — M54.40 CHRONIC LEFT-SIDED LOW BACK PAIN WITH SCIATICA, SCIATICA LATERALITY UNSPECIFIED: Primary | ICD-10-CM

## 2024-12-27 NOTE — TELEPHONE ENCOUNTER
Patient needs another referral for follow up with Dr. Behar. She will needs for a few follow ups. Patient has an appt with Dr. Behar for 1/9/25. Please advise

## 2025-01-02 ENCOUNTER — NURSE TRIAGE (OUTPATIENT)
Dept: INTERNAL MEDICINE CLINIC | Facility: CLINIC | Age: 70
End: 2025-01-02

## 2025-01-02 DIAGNOSIS — J44.9 COPD, MILD (HCC): Primary | ICD-10-CM

## 2025-01-02 RX ORDER — AZITHROMYCIN 250 MG/1
TABLET, FILM COATED ORAL
Qty: 6 TABLET | Refills: 0 | Status: SHIPPED | OUTPATIENT
Start: 2025-01-02 | End: 2025-01-07

## 2025-01-02 NOTE — TELEPHONE ENCOUNTER
Spoke with Taya who states she has been symptomatic with intermittent productive cough with yellow sputum since 12/28/24. Patient states has chronic bronchitis. Patient reported mild wheezing yesterday but not today. Patient denies increase in work of breathing. Patient has temperature of  F.  Patient denied doing home test for covid and does not have a home test. Patient requesting prescription for zpack.    Disposition: Seen in office today declined. Patient requesting Rx for Zpack.       RN advised Taya if she develops shortness of breath or severe wheezing to go to the emergency department. Patient voiced understanding.    Please advise.    Reason for Disposition   Known COPD or other severe lung disease (i.e., bronchiectasis, cystic fibrosis, lung surgery) and worsening symptoms (i.e., increased sputum purulence or amount, increased breathing difficulty)    Answer Assessment - Initial Assessment Questions  1. ONSET: \"When did the cough begin?\"       12/28/24  2. SEVERITY: \"How bad is the cough today?\"       Intermittent   3. SPUTUM: \"Describe the color of your sputum\" (none, dry cough; clear, white, yellow, green)      yellow  4. HEMOPTYSIS: \"Are you coughing up any blood?\" If so ask: \"How much?\" (flecks, streaks, tablespoons, etc.)      Denied but having small streaks of blood blowing nose. Patient denied nose bleed.   5. DIFFICULTY BREATHING: \"Are you having difficulty breathing?\" If Yes, ask: \"How bad is it?\" (e.g., mild, moderate, severe)     - MILD: No SOB at rest, mild SOB with walking, speaks normally in sentences, can lie down, no retractions, pulse < 100.     - MODERATE: SOB at rest, SOB with minimal exertion and prefers to sit, cannot lie down flat, speaks in phrases, mild retractions, audible wheezing, pulse 100-120.     - SEVERE: Very SOB at rest, speaks in single words, struggling to breathe, sitting hunched forward, retractions, pulse > 120       Denied  6. FEVER: \"Do you have a fever?\" If  Yes, ask: \"What is your temperature, how was it measured, and when did it start?\"       F   7. CARDIAC HISTORY: \"Do you have any history of heart disease?\" (e.g., heart attack, congestive heart failure)       Denied    8. LUNG HISTORY: \"Do you have any history of lung disease?\"  (e.g., pulmonary embolus, asthma, emphysema)      Chronic Bronchitis  9. PE RISK FACTORS: \"Do you have a history of blood clots?\" (or: recent major surgery, recent prolonged travel, bedridden)      Denied  10. OTHER SYMPTOMS: \"Do you have any other symptoms?\" (e.g., runny nose, wheezing, chest pain)        Wheezing yesterday but not today.  11. PREGNANCY: \"Is there any chance you are pregnant?\" \"When was your last menstrual period?\"        N/A  12. TRAVEL: \"Have you traveled out of the country in the last month?\" (e.g., travel history, exposures)        Denied    Protocols used: Cough-A-[place]

## 2025-01-02 NOTE — TELEPHONE ENCOUNTER
Patient says she is feeling sick since Saturday. Patient has a lot pf phlegm, slight fever, and coughing. Patient says she thinks she needs a zpac. Please call patient at cell

## 2025-01-09 ENCOUNTER — OFFICE VISIT (OUTPATIENT)
Dept: PHYSICAL MEDICINE AND REHAB | Facility: CLINIC | Age: 70
End: 2025-01-09
Payer: MEDICARE

## 2025-01-09 VITALS — WEIGHT: 149 LBS | BODY MASS INDEX: 22 KG/M2

## 2025-01-09 DIAGNOSIS — M81.0 AGE-RELATED OSTEOPOROSIS WITHOUT CURRENT PATHOLOGICAL FRACTURE: ICD-10-CM

## 2025-01-09 DIAGNOSIS — M51.16 LUMBAR DISC HERNIATION WITH RADICULOPATHY: ICD-10-CM

## 2025-01-09 DIAGNOSIS — M54.42 CHRONIC LEFT-SIDED LOW BACK PAIN WITH LEFT-SIDED SCIATICA: ICD-10-CM

## 2025-01-09 DIAGNOSIS — M54.2 TRIGGER POINT OF NECK: ICD-10-CM

## 2025-01-09 DIAGNOSIS — R29.2 HOFFMAN REFLEX POSITIVE: ICD-10-CM

## 2025-01-09 DIAGNOSIS — M50.30 DDD (DEGENERATIVE DISC DISEASE), CERVICAL: ICD-10-CM

## 2025-01-09 DIAGNOSIS — G89.29 CHRONIC LEFT-SIDED LOW BACK PAIN WITH LEFT-SIDED SCIATICA: ICD-10-CM

## 2025-01-09 DIAGNOSIS — M79.10 MYALGIA: Primary | ICD-10-CM

## 2025-01-09 DIAGNOSIS — M47.812 CERVICAL FACET SYNDROME: ICD-10-CM

## 2025-01-09 DIAGNOSIS — M51.372 DEGENERATION OF INTERVERTEBRAL DISC OF LUMBOSACRAL REGION WITH DISCOGENIC BACK PAIN AND LOWER EXTREMITY PAIN: ICD-10-CM

## 2025-01-09 DIAGNOSIS — M51.369 BULGE OF LUMBAR DISC WITHOUT MYELOPATHY: ICD-10-CM

## 2025-01-09 DIAGNOSIS — M47.816 LUMBAR SPONDYLOSIS: ICD-10-CM

## 2025-01-09 DIAGNOSIS — M54.59 MECHANICAL LOW BACK PAIN: ICD-10-CM

## 2025-01-09 DIAGNOSIS — R29.2 HYPERREFLEXIA: ICD-10-CM

## 2025-01-09 DIAGNOSIS — M48.061 LUMBAR FORAMINAL STENOSIS: ICD-10-CM

## 2025-01-09 DIAGNOSIS — M54.12 CERVICAL RADICULOPATHY: ICD-10-CM

## 2025-01-09 DIAGNOSIS — M47.816 FACET SYNDROME, LUMBAR: ICD-10-CM

## 2025-01-09 PROCEDURE — 99214 OFFICE O/P EST MOD 30 MIN: CPT | Performed by: PHYSICAL MEDICINE & REHABILITATION

## 2025-01-09 PROCEDURE — 1125F AMNT PAIN NOTED PAIN PRSNT: CPT | Performed by: PHYSICAL MEDICINE & REHABILITATION

## 2025-01-09 PROCEDURE — 1160F RVW MEDS BY RX/DR IN RCRD: CPT | Performed by: PHYSICAL MEDICINE & REHABILITATION

## 2025-01-09 PROCEDURE — 1159F MED LIST DOCD IN RCRD: CPT | Performed by: PHYSICAL MEDICINE & REHABILITATION

## 2025-01-09 NOTE — PATIENT INSTRUCTIONS
1) increase gabapentin to 100 mg twice per day if you are not going out and keep at 100 mg at night time if you are doing things during the day  2) Continue Flexeril (Cyclobenzaprine) 5 mg at night.   3) Tylenol 500-1000 mg every 6-8 hours as needed for pain.  No more than 3000 mg daily.  4) Continue home exercise program   5) If osteoporosis is better controlled, we can consider a LEFt L5 and LEFt S1 TFESI (transitional anatomy)   6) Follow up with me in about 2 months for the back  7) Please get X-rays of the cervical spine today on your way out.   8) Please call and schedule your MRI of the cervical spine at [phone].  Once you have your MRI scheduled, then call my office again to schedule a follow-up visit soon after your MRI so we may review the images together. Evaluating for hyperreflexia and positive Morris  9) restart physical therapy now focusing on the neck at [facility]

## 2025-01-09 NOTE — PROGRESS NOTES
[facility]  Progress Note    CHIEF COMPLAINT:    Chief Complaint   Patient presents with    Follow - Up     Lov 24 Lower back pain that radiates down the legs.Pain 4/10. No T/N. Continue HEP with no relief. Gabapentin and cyclobenzaprine daily with relief.        History of Present Illness:  The patient is a 69 year old right-handed female with significant past medical history of osteoporosis who presents for follow up of their low back pain with radiation to the left anteromedial thigh and left hamstring/gastroc region.  She has been doing okay with gabapentin and cyclobenzaprine daily.  She rates her pain a 4 out of 10 on her current regimen.  She is also complaining of neck pain with occasional radiation down into the parascapular region bilaterally.  She has been sleeping with a cervical collar as she has more discomfort when she is in cervical flexion.  She has not had any recent imaging of the cervical spine.  She does have some cramping in her hands.    PAST MEDICAL HISTORY:  Past Medical History:    Fibromyalgia    Osteoporosis    PONV (postoperative nausea and vomiting)       SURGICAL HISTORY:  Past Surgical History:   Procedure Laterality Date    Hysterectomy         SOCIAL HISTORY:   Social History     Occupational History    Not on file   Tobacco Use    Smoking status: Former     Current packs/day: 0.00     Types: Cigarettes     Quit date: 2023     Years since quittin.9    Smokeless tobacco: Never    Tobacco comments:     I quit for 9 years, went back to smoking    Substance and Sexual Activity    Alcohol use: Not Currently    Drug use: Never    Sexual activity: Not on file       FAMILY HISTORY:   Family History   Problem Relation Age of Onset    Hypertension Father     Stroke Father     Dementia Mother     Cancer Maternal Uncle     Heart Disease Brother         Coronary heart disease, hypertension    Hypertension Sister     Hypertension  Brother     Hypertension Brother         Hypertension       CURRENT MEDICATIONS:   Current Outpatient Medications   Medication Sig Dispense Refill    gabapentin 100 MG Oral Cap Take 1 capsule (100 mg total) by mouth nightly. 90 capsule 0    cyclobenzaprine 5 MG Oral Tab 5 mg 0.5-2 tablets three times per day as needed for spasms. Do not operate heavy machinery while on this medication as it may make you sleepy 90 tablet 0    oxybutynin ER 10 MG Oral Tablet 24 Hr Take 1 tablet (10 mg total) by mouth daily. 90 tablet 1    alendronate 70 MG Oral Tab Take 1 tablet (70 mg total) by mouth once a week. 12 tablet 3       ALLERGIES:   Allergies[1]    REVIEW OF SYSTEMS:   Review of Systems   Constitutional: Negative.    HENT: Negative.    Eyes: Negative.    Respiratory: Negative.    Cardiovascular: Negative.    Gastrointestinal: Negative.    Genitourinary: Negative.    Musculoskeletal: As per HPI  Skin: Negative.    Neurological: As per HPI  Endo/Heme/Allergies: Negative.    Psychiatric/Behavioral: Negative.      All other systems reviewed and are negative. Pertinent positives and negatives noted in the HPI.        PHYSICAL EXAM:   Wt 149 lb (67.6 kg)   BMI 22.00 kg/m²     Body mass index is 22 kg/m².      General: No immediate distress  Head: Normocephalic/ Atraumatic  Eyes: Extra-occular movements intact.   Ears: No auricular hematoma or deformities  Mouth: No lesions or ulcerations  Heart: peripheral pulses intact. Normal capillary refill.   Lungs: Non-labored respirations  Abdomen: No abdominal guarding  Extremities: No lower extremity edema bilaterally   Skin: No lesions noted.   Cognition: alert & oriented x 3, attentive, able to follow 2 step commands, comprehention intact, spontaneous speech intact  Motor:    Musculoskeletal:    CERVICAL SPINE:  Inspection: no erythema, swelling, or obvious deformity  Palpation: Tender to palpation midline cervical spine as well as bilateral cervical facets and paraspinals from see 3  down through C7, bilateral trapezius, levator scapula, and rhomboids with myofascial trigger points  ROM: intact to all planes of motion of cervical spine including side-bend bilaterally, rotation bilaterally, flexion, and extension   Strength: 5/5 in all myotomes of the BILATERAL upper extremities except 4 out of 5 during bilateral shoulder abduction (C4)  Sensation: Intact to light touch in all dermatomes of the BILATERAL upper extremities   Reflexes: 3/4 at C5, C6, C7 with a positive Morris's sign bilaterally  Spurling Test: negative for radicular symptoms down either extremity bilaterally        Data  [facility] Lab Encounter on 09/19/2024   Component Date Value Ref Range Status    Glucose 09/19/2024 112 (H)  70 - 99 mg/dL Final    Sodium 09/19/2024 142  136 - 145 mmol/L Final    Potassium 09/19/2024 4.3  3.5 - 5.1 mmol/L Final    Chloride 09/19/2024 108  98 - 112 mmol/L Final    CO2 09/19/2024 27.0  21.0 - 32.0 mmol/L Final    Anion Gap 09/19/2024 7  0 - 18 mmol/L Final    BUN 09/19/2024 12  9 - 23 mg/dL Final    Creatinine 09/19/2024 0.85  0.55 - 1.02 mg/dL Final    BUN/CREA Ratio 09/19/2024 14.1  10.0 - 20.0 Final    Calcium, Total 09/19/2024 9.5  8.7 - 10.4 mg/dL Final    Calculated Osmolality 09/19/2024 295  275 - 295 mOsm/kg Final    eGFR-Cr 09/19/2024 74  >=60 mL/min/1.73m2 Final    ALT 09/19/2024 10  10 - 49 U/L Final    AST 09/19/2024 24  <34 U/L Final    Alkaline Phosphatase 09/19/2024 72  55 - 142 U/L Final    Bilirubin, Total 09/19/2024 0.4  0.2 - 1.1 mg/dL Final    Total Protein 09/19/2024 7.0  5.7 - 8.2 g/dL Final    Albumin 09/19/2024 4.3  3.2 - 4.8 g/dL Final    Globulin  09/19/2024 2.7  2.0 - 3.5 g/dL Final    A/G Ratio 09/19/2024 1.6  1.0 - 2.0 Final    Patient Fasting for CMP? 09/19/2024 No   Final    Cholesterol, Total 09/19/2024 199  <200 mg/dL Final    HDL Cholesterol 09/19/2024 71 (H)  40 - 59 mg/dL Final    Triglycerides 09/19/2024 93  30 - 149 mg/dL Final    LDL Cholesterol 09/19/2024 111 (H)   <100 mg/dL Final    VLDL 09/19/2024 16  0 - 30 mg/dL Final    Non HDL Chol 09/19/2024 128  <130 mg/dL Final    Patient Fasting for Lipid? 09/19/2024 No   Final   ]      Radiology Imaging:  I reviewed with the patient her MRI of the lumbar spine from 10/29/2024  MRI SPINE LUMBAR (CPT=72148)  Narrative: PROCEDURE:  MRI SPINE LUMBAR (CPT=72148)     COMPARISON:  None.     INDICATIONS:  M81.0 Age-related osteoporosis without current pathological fracture M51.16 Lumbar disc herniation with radiculopathy M48.061 Lumbar foraminal stenosis M51.369*     TECHNIQUE:  Multiplanar T1 and T2 weighted images including fat suppression sequences.  Images acquired in sagittal and axial planes.       PATIENT STATED HISTORY: (As transcribed by Technologist)  Patient has chronic low back pain that is radiating down the left leg to foot.  She stated pain is getting worse.         FINDINGS:  Alignment of the lumbar spine is normal.  Vertebral body heights are maintained throughout the lumbar spine.  Moderate loss of the L5-S1 disc space with endplate osteoarthritic changes.  For numbering purposes there is lumbarization of S1.  Marrow signal is unremarkable.  The conus is at L1-L2.   The visualized portion of the spinal cord is of normal caliber without focal signal abnormality.  T12-L1: No disc herniation, spinal canal or neuroforaminal stenosis.  L1-L2: No disc herniation or spinal canal or neuroforaminal stenosis.  L2-L3: No disc herniation, spinal canal or neuroforaminal stenosis.  L3-L4: No disc herniation, spinal canal or neuroforaminal stenosis.  L4-L5:  Minimal disc bulge with ligamentum flavum thickening facet hypertrophic changes is noted.  Mild right neural foraminal stenosis and mild spinal canal stenosis.  L5-S1:  Minimal disc bulge without spinal canal stenosis.  No neural foraminal stenosis.                   Impression: CONCLUSION:       1. For numbering purposes there is lumbarization of S1.  The most superior partially  visualized vertebral body on sagittal sequences will be considered T11.     2. Mild osteoarthritic changes in the lower lumbar spine.          LOCATION:  [facility]        Dictated by (CST): Sherman Marquez MD on 10/29/2024 at 2:10 PM       Finalized by (CST): Sherman Marquez MD on 10/29/2024 at 2:14 PM         ASSESSMENT AND PLAN:  Taya is a pleasant 69-year-old female presents with bilateral neck pain and parascapular pain with radiation down into her arms.  On exam, she does have hyperreflexia with a positive Nguyễn's bilaterally.  I am recommending x-rays as well as an MRI of the cervical spine to further investigate her symptoms.  As a pertains to her lumbar spine I am recommending she increase her gabapentin to 100 mg twice per day if she is not going out or keep it at 100 mg at nighttime if she is doing errands throughout the day.  She can continue the Flexeril 5 mg at nighttime.  She can use Tylenol and continue her home exercise program.  We can consider a left L5 and left S1 transforaminal epidural steroid injection in the future.  I have also recommended she restart formal physical therapy focusing on the cervical spine.  I will see her again to review her MRI images.       RTC in 4 to 6 weeks for MRI review  Discharge Instructions were provided as documented in AVS summary.  The patient was in agreement with the assessment and plan.  All questions were answered.  There were no barriers to learning.         1. Myalgia    2. Chronic left-sided low back pain with left-sided sciatica    3. Facet syndrome, lumbar    4. Mechanical low back pain    5. Lumbar spondylosis    6. Degeneration of intervertebral disc of lumbosacral region with discogenic back pain and lower extremity pain    7. Bulge of lumbar disc without myelopathy    8. Lumbar foraminal stenosis    9. Lumbar disc herniation with radiculopathy    10. Age-related osteoporosis without current pathological fracture        Alex B. Behar  MD  Physical Medicine and Rehabilitation/Sports Medicine  [facility]         [1]   Allergies  Allergen Reactions    Strawberry HIVES     strawberries    Penicillin V UNKNOWN

## 2025-01-10 ENCOUNTER — TELEPHONE (OUTPATIENT)
Dept: INTERNAL MEDICINE CLINIC | Facility: CLINIC | Age: 70
End: 2025-01-10

## 2025-01-10 DIAGNOSIS — H35.30 MACULAR DEGENERATION, UNSPECIFIED LATERALITY, UNSPECIFIED TYPE: Primary | ICD-10-CM

## 2025-01-10 NOTE — TELEPHONE ENCOUNTER
Appa from [facility] called requesting a referral for PT and ophthalmology   For the ophthalmologist pt will be going to [facility]

## 2025-01-10 NOTE — TELEPHONE ENCOUNTER
Spoke with Taya who is requesting Ophthalmology referral for Dr. Marty Almaraz of [place]. Patient states she had seen Optometrist informed to go to an Ophthalmologist since she has the starting of macular degeneration.     Expedited Ophthalmology referral entered since her appointment is 1/20/25. Message sent to Zoraida Jimenez Referral Specialist in [facility] care to expedite review.

## 2025-01-10 NOTE — TELEPHONE ENCOUNTER
Appa is calling back requesting another referral for ophthalmologist dr. Marty Almaraz. Pt has an appointment on 01/20.

## 2025-01-13 ENCOUNTER — TELEPHONE (OUTPATIENT)
Dept: INTERNAL MEDICINE CLINIC | Facility: CLINIC | Age: 70
End: 2025-01-13

## 2025-01-13 NOTE — TELEPHONE ENCOUNTER
Spoke with patient.  Made her aware that MRI appointment must be set up so that Authorization may commence.

## 2025-01-13 NOTE — TELEPHONE ENCOUNTER
Pt is calling requesting referral for mri order. Pt states was told needs a referral from pcp.       Please call and advise

## 2025-01-24 ENCOUNTER — TELEPHONE (OUTPATIENT)
Dept: PHYSICAL MEDICINE AND REHAB | Facility: CLINIC | Age: 70
End: 2025-01-24

## 2025-01-30 RX ORDER — GABAPENTIN 100 MG/1
100 CAPSULE ORAL NIGHTLY
Qty: 90 CAPSULE | Refills: 0 | Status: SHIPPED | OUTPATIENT
Start: 2025-01-30

## 2025-01-30 RX ORDER — GABAPENTIN 100 MG/1
100 CAPSULE ORAL 2 TIMES DAILY
Qty: 60 CAPSULE | Refills: 2 | Status: SHIPPED | OUTPATIENT
Start: 2025-01-30 | End: 2025-01-30

## 2025-01-30 NOTE — TELEPHONE ENCOUNTER
Refill Request    Medication request: gabapentin 100 MG Oral Cap.  Take 1 capsule (100 mg total) by mouth 2 (two) times daily.      LOV:1/9/2025 Behar, Alex, MD   Due back to clinic per last office note:  RTC in 4 to 6 weeks for MRI review   NOV: 2/17/2025 Behar, Alex, [place]      [place]: 11/4/24 #90 (90 days)    Urine drug screen (if applicable): N/A  Pain contract: N/A    LOV plan (if weaning or changing medications): As a pertains to her lumbar spine I am recommending she increase her gabapentin to 100 mg twice per day if she is not going out or keep it at 100 mg at nighttime if she is doing errands throughout the day.

## 2025-02-06 ENCOUNTER — MED REC SCAN ONLY (OUTPATIENT)
Dept: PHYSICAL MEDICINE AND REHAB | Facility: CLINIC | Age: 70
End: 2025-02-06

## 2025-02-07 ENCOUNTER — HOSPITAL ENCOUNTER (OUTPATIENT)
Dept: GENERAL RADIOLOGY | Age: 70
Discharge: HOME | End: 2025-02-07
Attending: PHYSICAL MEDICINE & REHABILITATION
Payer: MEDICARE

## 2025-02-07 ENCOUNTER — HOSPITAL ENCOUNTER (OUTPATIENT)
Dept: MRI IMAGING | Age: 70
Discharge: HOME | End: 2025-02-07
Attending: PHYSICAL MEDICINE & REHABILITATION
Payer: MEDICARE

## 2025-02-07 DIAGNOSIS — M51.372 DEGENERATION OF INTERVERTEBRAL DISC OF LUMBOSACRAL REGION WITH DISCOGENIC BACK PAIN AND LOWER EXTREMITY PAIN: ICD-10-CM

## 2025-02-07 DIAGNOSIS — M51.369 BULGE OF LUMBAR DISC WITHOUT MYELOPATHY: ICD-10-CM

## 2025-02-07 DIAGNOSIS — G89.29 CHRONIC LEFT-SIDED LOW BACK PAIN WITH LEFT-SIDED SCIATICA: ICD-10-CM

## 2025-02-07 DIAGNOSIS — M47.816 FACET SYNDROME, LUMBAR: ICD-10-CM

## 2025-02-07 DIAGNOSIS — M79.10 MYALGIA: ICD-10-CM

## 2025-02-07 DIAGNOSIS — M54.42 CHRONIC LEFT-SIDED LOW BACK PAIN WITH LEFT-SIDED SCIATICA: ICD-10-CM

## 2025-02-07 DIAGNOSIS — R29.2 HOFFMAN REFLEX POSITIVE: ICD-10-CM

## 2025-02-07 DIAGNOSIS — M48.061 LUMBAR FORAMINAL STENOSIS: ICD-10-CM

## 2025-02-07 DIAGNOSIS — M50.30 DDD (DEGENERATIVE DISC DISEASE), CERVICAL: ICD-10-CM

## 2025-02-07 DIAGNOSIS — R29.2 HYPERREFLEXIA: ICD-10-CM

## 2025-02-07 DIAGNOSIS — M51.16 LUMBAR DISC HERNIATION WITH RADICULOPATHY: ICD-10-CM

## 2025-02-07 DIAGNOSIS — M54.2 TRIGGER POINT OF NECK: ICD-10-CM

## 2025-02-07 DIAGNOSIS — M54.59 MECHANICAL LOW BACK PAIN: ICD-10-CM

## 2025-02-07 DIAGNOSIS — M81.0 AGE-RELATED OSTEOPOROSIS WITHOUT CURRENT PATHOLOGICAL FRACTURE: ICD-10-CM

## 2025-02-07 DIAGNOSIS — M54.12 CERVICAL RADICULOPATHY: ICD-10-CM

## 2025-02-07 DIAGNOSIS — M47.816 LUMBAR SPONDYLOSIS: ICD-10-CM

## 2025-02-07 DIAGNOSIS — M47.812 CERVICAL FACET SYNDROME: ICD-10-CM

## 2025-02-07 PROCEDURE — 72050 X-RAY EXAM NECK SPINE 4/5VWS: CPT | Performed by: PHYSICAL MEDICINE & REHABILITATION

## 2025-02-07 PROCEDURE — 72141 MRI NECK SPINE W/O DYE: CPT | Performed by: PHYSICAL MEDICINE & REHABILITATION

## 2025-02-07 RX ORDER — GABAPENTIN 100 MG/1
100 CAPSULE ORAL NIGHTLY
Qty: 90 CAPSULE | Refills: 0 | OUTPATIENT
Start: 2025-02-07

## 2025-02-07 NOTE — TELEPHONE ENCOUNTER
Refill Request    Medication request: Gabapentin 100 MG Oral Cap  Take 1 capsule (100mg) by mouth two times daily     LOV:1/9/2025 Behar, Alex, MD   Due back to clinic per last office note:  Per Dr. Behar, \"Follow up with me in about 2 months for the back.\"  NOV: 2/17/2025 Behar, Alex, MD      [place]: 01/30/2025 #90 (45 day supply)    Urine drug screen (if applicable): N/A  Pain contract: N/A    LOV plan (if weaning or changing medications): Per. Dr. Behar, \"increase gabapentin to 100 mg twice per day if you are not going out and keep at 100 mg at night time if you are doing things during the day\"    Medication was signed 01/30/2025 for 90 tablets, refill is too soon. Refill denied

## 2025-02-10 RX ORDER — CYCLOBENZAPRINE HCL 5 MG
TABLET ORAL
Qty: 90 TABLET | Refills: 0 | Status: SHIPPED | OUTPATIENT
Start: 2025-02-10

## 2025-02-10 NOTE — TELEPHONE ENCOUNTER
Gabapentin 2 times a day. Patient called office stating she has been out of gabapentin as pharmacy stated they do not have the medication.     Called pharmacy and per pharmacy patient has an active script for Gabapentin 100 mg BID with also 2 refills left. Pharmacy stated they didn't fill the medication as patient did not request it.   Asked pharmacy to process the refill.     Informed patient as well.     Patient also request a refill on Cyclobenzaprine. Patient prefers to take 10 mg at night. Informed patient that the current script she can take 0.5-2 tablets of the 5 mg as needed, three times a day. Patient verbalized understanding.     Refill Request    Medication request: cyclobenzaprine 5 MG Oral Tab. 5 mg 0.5-2 tablets three times per day as needed for spasms. Do not operate heavy machinery while on this medication as it may make you sleepy      LOV:1/9/2025 Behar, Alex, MD   Due back to clinic per last office note:  Follow up with me in about 2 months for the back.   NOV: 2/17/2025 Behar, Alex, MD      [place]: 10/24/24 #90 (15 days)    Urine drug screen (if applicable): N/A  Pain contract: N/A    LOV plan (if weaning or changing medications): Continue Flexeril (Cyclobenzaprine) 5 mg at night.

## 2025-02-12 ENCOUNTER — LAB ENCOUNTER (OUTPATIENT)
Dept: LAB | Facility: HOSPITAL | Age: 70
End: 2025-02-12
Attending: INTERNAL MEDICINE
Payer: MEDICARE

## 2025-02-12 ENCOUNTER — OFFICE VISIT (OUTPATIENT)
Dept: INTERNAL MEDICINE CLINIC | Facility: CLINIC | Age: 70
End: 2025-02-12
Payer: MEDICARE

## 2025-02-12 VITALS
TEMPERATURE: 98 F | WEIGHT: 148 LBS | HEART RATE: 78 BPM | SYSTOLIC BLOOD PRESSURE: 132 MMHG | HEIGHT: 69 IN | OXYGEN SATURATION: 98 % | BODY MASS INDEX: 21.92 KG/M2 | DIASTOLIC BLOOD PRESSURE: 74 MMHG

## 2025-02-12 DIAGNOSIS — R53.83 FATIGUE, UNSPECIFIED TYPE: ICD-10-CM

## 2025-02-12 DIAGNOSIS — E04.2 MULTIPLE THYROID NODULES: ICD-10-CM

## 2025-02-12 DIAGNOSIS — I70.0 ATHEROSCLEROSIS OF ABDOMINAL AORTA: ICD-10-CM

## 2025-02-12 DIAGNOSIS — Z12.31 BREAST CANCER SCREENING BY MAMMOGRAM: Primary | ICD-10-CM

## 2025-02-12 DIAGNOSIS — J44.9 COPD, MILD (HCC): Chronic | ICD-10-CM

## 2025-02-12 DIAGNOSIS — N32.81 OVERACTIVE BLADDER: ICD-10-CM

## 2025-02-12 LAB
ALBUMIN SERPL-MCNC: 4.3 G/DL (ref 3.2–4.8)
ALBUMIN/GLOB SERPL: 1.9 {RATIO} (ref 1–2)
ALP LIVER SERPL-CCNC: 71 U/L
ALT SERPL-CCNC: <7 U/L
ANION GAP SERPL CALC-SCNC: 7 MMOL/L (ref 0–18)
AST SERPL-CCNC: 21 U/L (ref ?–34)
BASOPHILS # BLD AUTO: 0.03 X10(3) UL (ref 0–0.2)
BASOPHILS NFR BLD AUTO: 0.6 %
BILIRUB SERPL-MCNC: 0.4 MG/DL (ref 0.2–1.1)
BUN BLD-MCNC: 11 MG/DL (ref 9–23)
BUN/CREAT SERPL: 14.3 (ref 10–20)
CALCIUM BLD-MCNC: 9.2 MG/DL (ref 8.7–10.4)
CHLORIDE SERPL-SCNC: 107 MMOL/L (ref 98–112)
CO2 SERPL-SCNC: 30 MMOL/L (ref 21–32)
CREAT BLD-MCNC: 0.77 MG/DL
DEPRECATED RDW RBC AUTO: 42.5 FL (ref 35.1–46.3)
EGFRCR SERPLBLD CKD-EPI 2021: 83 ML/MIN/1.73M2 (ref 60–?)
EOSINOPHIL # BLD AUTO: 0.1 X10(3) UL (ref 0–0.7)
EOSINOPHIL NFR BLD AUTO: 1.9 %
ERYTHROCYTE [DISTWIDTH] IN BLOOD BY AUTOMATED COUNT: 12.8 % (ref 11–15)
FASTING STATUS PATIENT QL REPORTED: NO
GLOBULIN PLAS-MCNC: 2.3 G/DL (ref 2–3.5)
GLUCOSE BLD-MCNC: 83 MG/DL (ref 70–99)
HCT VFR BLD AUTO: 37.1 %
HGB BLD-MCNC: 12.6 G/DL
IMM GRANULOCYTES # BLD AUTO: 0.01 X10(3) UL (ref 0–1)
IMM GRANULOCYTES NFR BLD: 0.2 %
LYMPHOCYTES # BLD AUTO: 2.26 X10(3) UL (ref 1–4)
LYMPHOCYTES NFR BLD AUTO: 43 %
MCH RBC QN AUTO: 30.9 PG (ref 26–34)
MCHC RBC AUTO-ENTMCNC: 34 G/DL (ref 31–37)
MCV RBC AUTO: 90.9 FL
MONOCYTES # BLD AUTO: 0.64 X10(3) UL (ref 0.1–1)
MONOCYTES NFR BLD AUTO: 12.2 %
NEUTROPHILS # BLD AUTO: 2.22 X10 (3) UL (ref 1.5–7.7)
NEUTROPHILS # BLD AUTO: 2.22 X10(3) UL (ref 1.5–7.7)
NEUTROPHILS NFR BLD AUTO: 42.1 %
OSMOLALITY SERPL CALC.SUM OF ELEC: 297 MOSM/KG (ref 275–295)
PLATELET # BLD AUTO: 202 10(3)UL (ref 150–450)
POTASSIUM SERPL-SCNC: 3.4 MMOL/L (ref 3.5–5.1)
PROT SERPL-MCNC: 6.6 G/DL (ref 5.7–8.2)
RBC # BLD AUTO: 4.08 X10(6)UL
SODIUM SERPL-SCNC: 144 MMOL/L (ref 136–145)
TSI SER-ACNC: 2.33 UIU/ML (ref 0.55–4.78)
WBC # BLD AUTO: 5.3 X10(3) UL (ref 4–11)

## 2025-02-12 PROCEDURE — 85025 COMPLETE CBC W/AUTO DIFF WBC: CPT

## 2025-02-12 PROCEDURE — 80053 COMPREHEN METABOLIC PANEL: CPT

## 2025-02-12 PROCEDURE — 84443 ASSAY THYROID STIM HORMONE: CPT

## 2025-02-12 PROCEDURE — 36415 COLL VENOUS BLD VENIPUNCTURE: CPT

## 2025-02-12 NOTE — PROGRESS NOTES
Taya Bruce is a 69 year old  who presents for a MA AHA (Medicare Advantage Annual Health Assessment) and Medicare Subsequent Annual Wellness visit (Pt already had Initial Annual Wellness)    Discussed medicare wellness , reviewed assessments and health maintenance for screening and immunizations.  We reviewed and assessed medical problems and medications    We discussed her COPD which is mild not on any medications atherosclerosis of aorta without symptoms overactive bladder for which he takes oxybutynin.  She follows with physiatry for back and neck pain gabapentin helps her jerking movements.  Takes Flexeril now just at nighttime.  Is on Fosamax for osteoporosis.      Allergies:   is allergic to strawberry and penicillin v.  Medical History:    has a past medical history of Fibromyalgia, Osteoporosis, and PONV (postoperative nausea and vomiting).  Surgical History:    has a past surgical history that includes hysterectomy.   Family History:   family history includes Cancer in her maternal uncle; Dementia in her mother; Heart Disease in her brother; Hypertension in her brother, brother, father, and sister; Stroke in her father.  Social History:    reports that she quit smoking about 2 years ago. Her smoking use included cigarettes. She has never used smokeless tobacco. She reports that she does not currently use alcohol. She reports that she does not use drugs.        Fall Risk Assessment:   She has been screened for Falls and is High Risk. Fall Prevention information provided to patient in After Visit Summary.    Do you feel unsteady when standing or walking?: (Patient-Rptd) No  Do you worry about falling?: (Patient-Rptd) Yes  Have you fallen in the past year?: (Patient-Rptd) No   Has  some leg wekness  mostly in left  leg    Cognitive Assessment:   She had a completely normal cognitive assessment - see flowsheet entries     Functional Ability/Status:   Taya Bruce has a completely normal  functional assessment. See flowsheet for details.        Depression Screening (PHQ-2/PHQ-9): PHQ-2 SCORE: 1  , done 2025   Feeling down, depressed, or hopeless: 1           Advanced Directives:   discussed end of life issues   family aware of wishes and would act on behalf     Tobacco:  She smoked tobacco in the past but quit greater than 12 months ago.  Social History     Tobacco Use   Smoking Status Former    Current packs/day: 0.00    Types: Cigarettes    Quit date: 2023    Years since quittin.0   Smokeless Tobacco Never   Tobacco Comments    I quit for 9 years, went back to smoking         CAGE Alcohol Screen:   CAGE screening score of 0 on 2025, showing low risk of alcohol abuse.          Patient Care Team:  Carlos Rangel MD as PCP - General (Internal Medicine)          Objective:   /74   Pulse 78   Temp 98.4 °F (36.9 °C)   Ht 5' 9\" (1.753 m)   Wt 148 lb (67.1 kg)   SpO2 98%   BMI 21.86 kg/m²    Estimated body mass index is 21.86 kg/m² as calculated from the following:    Height as of this encounter: 5' 9\" (1.753 m).    Weight as of this encounter: 148 lb (67.1 kg).  neuro   and  cognition are nl   neck exam nl   Cor reg   lungs clear   abd nl   ext nl     Medicare Hearing Assessment:  Hearing Screening    Time taken: 2025 12:21 PM  Entry User: Carlos Rangel MD  Screening Method: Finger Rub  Finger Rub Result: Pass       Visual Acuity:   Visual Acuity:   Right Eye Visual Acuity: Uncorrected Right Eye Chart Acuity: 20/20   Left Eye Visual Acuity: Uncorrected Left Eye Chart Acuity: 20/20   Both Eyes Visual Acuity: Uncorrected Both Eyes Chart Acuity: 20/20   Able To Tolerate Visual Acuity: Yes        Current Outpatient Medications:     cyclobenzaprine 5 MG Oral Tab, 5 mg 0.5-2 tablets three times per day as needed for spasms. Do not operate heavy machinery while on this medication as it may make you sleepy, Disp: 90 tablet, Rfl: 0    GABAPENTIN 100 MG Oral Cap, Take 1  capsule by mouth nightly, Disp: 90 capsule, Rfl: 0    oxybutynin ER 10 MG Oral Tablet 24 Hr, Take 1 tablet (10 mg total) by mouth daily., Disp: 90 tablet, Rfl: 1    alendronate 70 MG Oral Tab, Take 1 tablet (70 mg total) by mouth once a week., Disp: 12 tablet, Rfl: 3     ASSESSMENT  and   PLAN    Medicare wellness  Dicussed the screening assessments, disease prevention and screening  and immunization for age-   Recommend  healthy diet, lifestyle, and exercise. Discussed current state of health.    Diagnoses and all orders for this visit:    Breast cancer screening by mammogram-mammogram ordered  -     [facility] TRACY 2D+3D SCREENING BILAT (CPT=77067/48772); Future    COPD, mild (HCC)-has mild disease noted on imaging no symptoms no inhalers.  Discussed importance of vaccines    Multiple thyroid nodules-has no symptoms.  Last ultrasound was in November 21 she has history of right lobectomy and partial isthmectomy.  -     Comp Metabolic Panel (14); Future  -     TSH W Reflex To Free T4; Future    Atherosclerosis of abdominal aorta-.  On imaging has no symptoms.  -     CBC With Differential With Platelet; Future    Overactive bladder-is doing well with oxybutynin we will continue it    Fatigue, unspecified type-some nonspecific fatigue we will check CBC and labs.  -     CBC With Differential With Platelet; Future                      No follow-ups on file.     Carlos Rangel MD      General Health:  In the past six months, have you lost more than 10 pounds without trying?: (Patient-Rptd) 2 - No  How does the patient maintain a good energy level?: (Patient-Rptd) Daily Walks  How would you describe your daily physical activity?: (Patient-Rptd) Moderate  How would you describe your current health state?: (Patient-Rptd) Good  How do you maintain positive mental well-being?: (Patient-Rptd) Social Interaction;Puzzles;Visiting Friends;Visiting Family  On a scale of 0 to 10, with 0 being no pain and 10 being severe pain, what is  your pain level?: (Patient-Rptd) 6 - (Moderate)  In the past six months, have you experienced urine leakage?: (Patient-Rptd) 0-No  At any time do you feel concerned for the safety/well-being of yourself and/or your children, in your home or elsewhere?: (Patient-Rptd) No  Have you had any immunizations at another office such as Influenza, Hepatitis B, Tetanus, or Pneumococcal?: (Patient-Rptd) Yes       Taya Bruce's SCREENING SCHEDULE   Tests on this list are recommended by your physician but may not be covered, or covered at this frequency, by your insurer.   Please check with your insurance carrier before scheduling to verify coverage.   PREVENTATIVE SERVICES FREQUENCY &  COVERAGE DETAILS LAST COMPLETION DATE   Diabetes Screening    Fasting Blood Sugar /  Glucose    One screening every 12 months if never tested or if previously tested but not diagnosed with pre-diabetes   One screening every 6 months if diagnosed with pre-diabetes Lab Results   Component Value Date    GLU 83 02/12/2025        Cardiovascular Disease Screening    Lipid Panel  Cholesterol  Lipoprotein (HDL)  Triglycerides Covered every 5 years for all Medicare beneficiaries without apparent signs or symptoms of cardiovascular disease Lab Results   Component Value Date    CHOLEST 199 09/19/2024    HDL 71 (H) 09/19/2024     (H) 09/19/2024    TRIG 93 09/19/2024         Electrocardiogram (EKG)   Covered if needed at Welcome to Medicare, and non-screening if indicated for medical reasons -      Ultrasound Screening for Abdominal Aortic Aneurysm (AAA) Covered once in a lifetime for one of the following risk factors    Men who are 65-75 years old and have ever smoked    Anyone with a family history -     Colorectal Cancer Screening  Covered for ages 50-85; only need ONE of the following:    Colonoscopy   Covered every 10 years    Covered every 2 years if patient is at high risk or previous colonoscopy was abnormal 10/14/2022    Health  Maintenance   Topic Date Due    Colorectal Cancer Screening  10/14/2027       Flexible Sigmoidoscopy   Covered every 4 years -    Fecal Occult Blood Test Covered annually -   Bone Density Screening    Bone density screening    Covered every 2 years after age 65 if diagnosed with risk of osteoporosis or estrogen deficiency.    Covered yearly for long-term glucocorticoid medication use (Steroids) Last Dexa Scan:    XR DEXA BONE DENSITOMETRY (CPT=77080) 09/19/2024      No recommendations at this time   Pap and Pelvic    Pap   Covered every 2 years for women at normal risk; Annually if at high risk -  No recommendations at this time    Chlamydia Annually if high risk -  No recommendations at this time   Screening Mammogram    Mammogram     Recommend annually for all female patients aged 40 and older    One baseline mammogram covered for patients aged 35-39 03/20/2024    Health Maintenance   Topic Date Due    Mammogram  03/20/2025       Immunizations    Influenza Covered once per flu season  Please get every year -  Influenza Vaccine(1) due on 10/01/2024    Pneumococcal Each vaccine ([id number] & [id number]) covered once after 65 Prevnar 13: 08/26/2015    [id number]: 10/19/2021     No recommendations at this time    Hepatitis B One screening covered for patients with certain risk factors   -  No recommendations at this time    Tetanus Toxoid Not covered by Medicare Part B unless medically necessary (cut with metal); may be covered with your pharmacy prescription benefits -    Tetanus, Diptheria and Pertusis TD and TDaP Not covered by Medicare Part B -  No recommendations at this time    Zoster Not covered by Medicare Part B; may be covered with your pharmacy  prescription benefits 08/26/2015  Zoster Vaccines(2 of 3) due on 10/21/2015     Annual Monitoring of Persistent Medications (ACE/ARB, digoxin diuretics, anticonvulsants)    Potassium Annually Lab Results   Component Value Date    K 3.4 (L) 02/12/2025          Creatinine   Annually Lab Results   Component Value Date    CREATSERUM 0.77 02/12/2025         BUN Annually Lab Results   Component Value Date    BUN 11 02/12/2025       Drug Serum Conc Annually No results found for: \"DIGOXIN\", \"DIG\", \"VALP\"           Chronic Obstructive Pulmonary Disease (COPD)    Spirometry Annually Spirometry date:

## 2025-02-17 ENCOUNTER — OFFICE VISIT (OUTPATIENT)
Dept: PHYSICAL MEDICINE AND REHAB | Facility: CLINIC | Age: 70
End: 2025-02-17
Payer: MEDICARE

## 2025-02-17 VITALS — WEIGHT: 148 LBS | BODY MASS INDEX: 21.92 KG/M2 | HEIGHT: 69 IN

## 2025-02-17 DIAGNOSIS — M48.02 FORAMINAL STENOSIS OF CERVICAL REGION: ICD-10-CM

## 2025-02-17 DIAGNOSIS — M47.816 FACET SYNDROME, LUMBAR: ICD-10-CM

## 2025-02-17 DIAGNOSIS — M50.30 DDD (DEGENERATIVE DISC DISEASE), CERVICAL: ICD-10-CM

## 2025-02-17 DIAGNOSIS — M51.16 LUMBAR DISC HERNIATION WITH RADICULOPATHY: ICD-10-CM

## 2025-02-17 DIAGNOSIS — M54.59 MECHANICAL LOW BACK PAIN: ICD-10-CM

## 2025-02-17 DIAGNOSIS — M51.369 BULGE OF LUMBAR DISC WITHOUT MYELOPATHY: ICD-10-CM

## 2025-02-17 DIAGNOSIS — M54.2 TRIGGER POINT OF NECK: Primary | ICD-10-CM

## 2025-02-17 DIAGNOSIS — M54.42 CHRONIC LEFT-SIDED LOW BACK PAIN WITH LEFT-SIDED SCIATICA: ICD-10-CM

## 2025-02-17 DIAGNOSIS — M79.10 MYALGIA: ICD-10-CM

## 2025-02-17 DIAGNOSIS — M47.816 LUMBAR SPONDYLOSIS: ICD-10-CM

## 2025-02-17 DIAGNOSIS — M48.061 LUMBAR FORAMINAL STENOSIS: ICD-10-CM

## 2025-02-17 DIAGNOSIS — M51.372 DEGENERATION OF INTERVERTEBRAL DISC OF LUMBOSACRAL REGION WITH DISCOGENIC BACK PAIN AND LOWER EXTREMITY PAIN: ICD-10-CM

## 2025-02-17 DIAGNOSIS — G89.29 CHRONIC LEFT-SIDED LOW BACK PAIN WITH LEFT-SIDED SCIATICA: ICD-10-CM

## 2025-02-17 DIAGNOSIS — M47.812 CERVICAL FACET SYNDROME: ICD-10-CM

## 2025-02-17 RX ORDER — CYCLOBENZAPRINE HCL 5 MG
TABLET ORAL
Qty: 90 TABLET | Refills: 0 | Status: SHIPPED | OUTPATIENT
Start: 2025-02-17

## 2025-02-17 NOTE — PATIENT INSTRUCTIONS
1) My office will call you to schedule the BILATERAL C5-C6 and C6-C7 facet joint injections under local anesthesia with oral sedation once the procedure is approved by your insurance carrier.    2) Follow up with me 2 weeks after the procedure. This can be in the office or virtually.   3) Tylenol 500-1000 mg every 6-8 hours as needed for pain.  No more than 3000 mg daily.  4)  Ice 20 minutes at a time 3-4 times per day  5) Start cyclobenzaprine 5 mg 0.5-2 tablets three times per day as needed for spasms. Do not operate heavy machinery while on this medication as it may make you sleepy.   6) Continue gabapentin 100 mg at night

## 2025-02-17 NOTE — PROGRESS NOTES
[facility]  Progress Note    CHIEF COMPLAINT:    Chief Complaint   Patient presents with    Follow - Up     LOV 25. F/U to review MRI, XR C Spine 25. Pain 4/10 in bilateral neck/shoulders. Denies N/T, weakness. Pt takes gabapentin daily with improvement. Pt in PT now with improvement.       History of Present Illness:  The patient is a 69 year old  right-handed female with significant past medical history of osteoporosis who presents for follow up of her bilateral neck pain without radicular symptoms.  Her pain is aggravated by rotation and extension.  She has had an MRI of the cervical spine which I independently reviewed with her today.  She rates her pain a 4 out of 10.  She has been taking gabapentin daily and has also been using lidocaine patches.  She has also been in physical therapy.    PAST MEDICAL HISTORY:  Past Medical History:    Fibromyalgia    Osteoporosis    PONV (postoperative nausea and vomiting)       SURGICAL HISTORY:  Past Surgical History:   Procedure Laterality Date    Hysterectomy         SOCIAL HISTORY:   Social History     Occupational History    Not on file   Tobacco Use    Smoking status: Former     Current packs/day: 0.00     Types: Cigarettes     Quit date: 2023     Years since quittin.0    Smokeless tobacco: Never    Tobacco comments:     I quit for 9 years, went back to smoking    Vaping Use    Vaping status: Never Used   Substance and Sexual Activity    Alcohol use: Not Currently    Drug use: Never    Sexual activity: Not on file       FAMILY HISTORY:   Family History   Problem Relation Age of Onset    Hypertension Father     Stroke Father     Dementia Mother     Cancer Maternal Uncle     Heart Disease Brother         Coronary heart disease, hypertension    Hypertension Sister     Hypertension Brother     Hypertension Brother         Hypertension       CURRENT MEDICATIONS:   Current Outpatient Medications    Medication Sig Dispense Refill    cyclobenzaprine 5 MG Oral Tab 5 mg 0.5-2 tablets three times per day as needed for spasms. Do not operate heavy machinery while on this medication as it may make you sleepy 90 tablet 0    GABAPENTIN 100 MG Oral Cap Take 1 capsule by mouth nightly 90 capsule 0    oxybutynin ER 10 MG Oral Tablet 24 Hr Take 1 tablet (10 mg total) by mouth daily. 90 tablet 1    alendronate 70 MG Oral Tab Take 1 tablet (70 mg total) by mouth once a week. 12 tablet 3       ALLERGIES:   Allergies[1]    REVIEW OF SYSTEMS:   Review of Systems   Constitutional: Negative.    HENT: Negative.    Eyes: Negative.    Respiratory: Negative.    Cardiovascular: Negative.    Gastrointestinal: Negative.    Genitourinary: Negative.    Musculoskeletal: As per HPI  Skin: Negative.    Neurological: As per HPI  Endo/Heme/Allergies: Negative.    Psychiatric/Behavioral: Negative.      All other systems reviewed and are negative. Pertinent positives and negatives noted in the HPI.        PHYSICAL EXAM:   Ht 69\"   Wt 148 lb (67.1 kg)   BMI 21.86 kg/m²     Body mass index is 21.86 kg/m².      General: No immediate distress  Head: Normocephalic/ Atraumatic  Eyes: Extra-occular movements intact.   Ears: No auricular hematoma or deformities  Mouth: No lesions or ulcerations  Heart: peripheral pulses intact. Normal capillary refill.   Lungs: Non-labored respirations  Abdomen: No abdominal guarding  Extremities: No lower extremity edema bilaterally   Skin: No lesions noted.   Cognition: alert & oriented x 3, attentive, able to follow 2 step commands, comprehension intact, spontaneous speech intact  Motor:    Musculoskeletal:        Data  EE Lab Encounter on 02/12/2025   Component Date Value Ref Range Status    WBC 02/12/2025 5.3  4.0 - 11.0 x10(3) uL Final    RBC 02/12/2025 4.08  3.80 - 5.30 x10(6)uL Final    HGB 02/12/2025 12.6  12.0 - 16.0 g/dL Final    HCT 02/12/2025 37.1  35.0 - 48.0 % Final    MCV 02/12/2025 90.9  80.0 -  100.0 fL Final    MCH 02/12/2025 30.9  26.0 - 34.0 pg Final    MCHC 02/12/2025 34.0  31.0 - 37.0 g/dL Final    RDW-SD 02/12/2025 42.5  35.1 - 46.3 fL Final    RDW 02/12/2025 12.8  11.0 - 15.0 % Final    PLT 02/12/2025 202.0  150.0 - 450.0 10(3)uL Final    Neutrophil Absolute Prelim 02/12/2025 2.22  1.50 - 7.70 x10 (3) uL Final    Neutrophil Absolute 02/12/2025 2.22  1.50 - 7.70 x10(3) uL Final    Lymphocyte Absolute 02/12/2025 2.26  1.00 - 4.00 x10(3) uL Final    Monocyte Absolute 02/12/2025 0.64  0.10 - 1.00 x10(3) uL Final    Eosinophil Absolute 02/12/2025 0.10  0.00 - 0.70 x10(3) uL Final    Basophil Absolute 02/12/2025 0.03  0.00 - 0.20 x10(3) uL Final    Immature Granulocyte Absolute 02/12/2025 0.01  0.00 - 1.00 x10(3) uL Final    Neutrophil % 02/12/2025 42.1  % Final    Lymphocyte % 02/12/2025 43.0  % Final    Monocyte % 02/12/2025 12.2  % Final    Eosinophil % 02/12/2025 1.9  % Final    Basophil % 02/12/2025 0.6  % Final    Immature Granulocyte % 02/12/2025 0.2  % Final    Glucose 02/12/2025 83  70 - 99 mg/dL Final    Sodium 02/12/2025 144  136 - 145 mmol/L Final    Potassium 02/12/2025 3.4 (L)  3.5 - 5.1 mmol/L Final    Chloride 02/12/2025 107  98 - 112 mmol/L Final    CO2 02/12/2025 30.0  21.0 - 32.0 mmol/L Final    Anion Gap 02/12/2025 7  0 - 18 mmol/L Final    BUN 02/12/2025 11  9 - 23 mg/dL Final    Creatinine 02/12/2025 0.77  0.55 - 1.02 mg/dL Final    BUN/CREA Ratio 02/12/2025 14.3  10.0 - 20.0 Final    Calcium, Total 02/12/2025 9.2  8.7 - 10.4 mg/dL Final    Calculated Osmolality 02/12/2025 297 (H)  275 - 295 mOsm/kg Final    eGFR-Cr 02/12/2025 83  >=60 mL/min/1.73m2 Final    ALT 02/12/2025 <7 (L)  10 - 49 U/L Final    AST 02/12/2025 21  <34 U/L Final    Alkaline Phosphatase 02/12/2025 71  55 - 142 U/L Final    Bilirubin, Total 02/12/2025 0.4  0.2 - 1.1 mg/dL Final    Total Protein 02/12/2025 6.6  5.7 - 8.2 g/dL Final    Albumin 02/12/2025 4.3  3.2 - 4.8 g/dL Final    Globulin  02/12/2025 2.3  2.0 -  3.5 g/dL Final    A/G Ratio 02/12/2025 1.9  1.0 - 2.0 Final    Patient Fasting for CMP? 02/12/2025 No   Final    TSH 02/12/2025 2.328  0.550 - 4.780 uIU/mL Final   [facility] Lab Encounter on 09/19/2024   Component Date Value Ref Range Status    Glucose 09/19/2024 112 (H)  70 - 99 mg/dL Final    Sodium 09/19/2024 142  136 - 145 mmol/L Final    Potassium 09/19/2024 4.3  3.5 - 5.1 mmol/L Final    Chloride 09/19/2024 108  98 - 112 mmol/L Final    CO2 09/19/2024 27.0  21.0 - 32.0 mmol/L Final    Anion Gap 09/19/2024 7  0 - 18 mmol/L Final    BUN 09/19/2024 12  9 - 23 mg/dL Final    Creatinine 09/19/2024 0.85  0.55 - 1.02 mg/dL Final    BUN/CREA Ratio 09/19/2024 14.1  10.0 - 20.0 Final    Calcium, Total 09/19/2024 9.5  8.7 - 10.4 mg/dL Final    Calculated Osmolality 09/19/2024 295  275 - 295 mOsm/kg Final    eGFR-Cr 09/19/2024 74  >=60 mL/min/1.73m2 Final    ALT 09/19/2024 10  10 - 49 U/L Final    AST 09/19/2024 24  <34 U/L Final    Alkaline Phosphatase 09/19/2024 72  55 - 142 U/L Final    Bilirubin, Total 09/19/2024 0.4  0.2 - 1.1 mg/dL Final    Total Protein 09/19/2024 7.0  5.7 - 8.2 g/dL Final    Albumin 09/19/2024 4.3  3.2 - 4.8 g/dL Final    Globulin  09/19/2024 2.7  2.0 - 3.5 g/dL Final    A/G Ratio 09/19/2024 1.6  1.0 - 2.0 Final    Patient Fasting for CMP? 09/19/2024 No   Final    Cholesterol, Total 09/19/2024 199  <200 mg/dL Final    HDL Cholesterol 09/19/2024 71 (H)  40 - 59 mg/dL Final    Triglycerides 09/19/2024 93  30 - 149 mg/dL Final    LDL Cholesterol 09/19/2024 111 (H)  <100 mg/dL Final    VLDL 09/19/2024 16  0 - 30 mg/dL Final    Non HDL Chol 09/19/2024 128  <130 mg/dL Final    Patient Fasting for Lipid? 09/19/2024 No   Final   ]      Radiology Imaging:  I reviewed with the patient her MRI of the cervical spine from 2/11/2025  MRI SPINE CERVICAL (CPT=72141)  Narrative: PROCEDURE: MRI SPINE CERVICAL (CPT=72141)     COMPARISON: [facility], XR CERVICAL SPINE AP LAT FLEX EXT EM (CPT=72050),  2/07/2025, 12:46 PM.     INDICATIONS: Cervical degenerative disc disease with cervical facet syndrome and radiculopathy; hyperreflexia; Morris reflex positive (R29.2, M54.12, M50.30, M47.812).     TECHNIQUE: A variety of imaging planes and parameters were utilized for visualization of suspected pathology.       FINDINGS:   ALIGNMENT: The anatomic cervical lordosis is interrupted by minimal anterolisthesis of C3 on C4 and slight retrolisthesis C5 on C6.  BONES: No fractures or osseous lesions are apparent. Multilevel anterior osteophyte formation is demonstrated, most notably at the C4-C7 levels.  CORD: Normal caliber, contour, and signal intensity.    CRANIOCERVICAL AREA: Normal foramen magnum without Chiari malformation.    PARASPINAL AREA: No visible mass.    OTHER: There is no visible swelling of the prevertebral soft tissues. Nodular mucosal thickening and mucous retention cyst formation of the right maxillary sinus is appreciated.     CERVICAL DISC LEVELS:  C2-C3: A tiny central posterior disc protrusion is appreciated. There is no significant spinal canal or foraminal narrowing.  C3-C4: Mild disc degeneration is observed, and there is a central disc protrusion. Mild facet arthropathy is manifested without significant spinal canal or foraminal encroachment.  C4-C5: A tiny central disc protrusion is noted. There is bilateral uncovertebral hypertrophy. Left worse than right facet arthropathy is appreciated. There does not appear to be significant spinal canal or foraminal compromise.  C5-C6: Posterior disc osteophyte complex formation is apparent with prominent left larger than right foraminal protrusions and left greater than right uncovertebral hypertrophy. These findings cause mild spinal canal narrowing with severe bilateral   foraminal impingement.  C6-C7: Posterior disc osteophyte complex formation is seen with prominent central and left larger than right foraminal protrusions. There is left greater than  right uncovertebral hypertrophy. These findings contribute to mild spinal canal narrowing   abutment (but no indentation) of the ventral cord contour.  C7-T1: Left-sided perineural cysts are noted; there is no significant disc degeneration, facet abnormality, spinal canal stenosis, or foraminal stenosis.                 Impression: CONCLUSION:   1. Degenerative disc disease, particularly at C5-C6 and C6-C7, where there is mild spinal canal narrowing.     2. Severe bilateral foraminal compromise at C5-C6.     3. Negative for abnormal intrinsic cord signal intensity.     4. No acute osseous injury of the cervical spine is apparent.     5. Lesser incidental findings as above.           elm-remote.        Dictated by (CST): Carl Modi MD on 2/11/2025 at 9:33 PM       Finalized by (CST): Carl Modi MD on 2/11/2025 at 9:40 PM          XR CERVICAL SPINE AP LAT FLEX EXT (CPT=72050)  Narrative: PROCEDURE: XR CERVICAL SPINE AP LAT FLEX EXT EM (CPT=72050)     COMPARISON: None.     INDICATIONS: Chronic neck pain. Evaluation for  Cervical radiculopathy, DDD (degenerative disc disease), cervical , Cervical facet syndrom*     TECHNIQUE: Cervical spine radiographs with flexion and extension views. (4 views)     FINDINGS:         ALIGNMENT: Normal alignment.    ATLANTOAXIAL: Normal odontoid and atlantoaxial joints.    VERTEBRAL BODIES:    No acute fracture or malalignment.  There is multilevel degenerative change with marginal osteophyte formation and facet arthropathy.  DISC SPACES:   Multilevel disc space narrowing most significant at C5-C6 and C6-C7.  PREVERTEBRAL SOFT TISSUES: Negative.    FLEXION/EXTENSION: Limited range of motion.  No subluxation during flexion and extension.    OTHER: Negative.                Impression: CONCLUSION:      Moderate cervical spine degenerative change most significant C5-C6 and C6-C7.           Dictated by (CST): Zia Wood MD on 2/11/2025 at 4:11 PM       Finalized by (CST):  Zia Wood MD on 2/11/2025 at 4:16 PM              ASSESSMENT AND PLAN:  Taya is a pleasant 69-year-old female presents for follow-up of her bilateral neck and upper back pain which I believe is due to cervical facet arthropathy and facet syndrome.  Her MRI does demonstrate disc bulges at C5-C6 and C6-C7 with bilateral foraminal stenosis at C5-C6.  I am recommending bilateral C5-C6 and C6-C7 facet joint injections under local anesthesia.  If her symptoms do not improve with this, then I would recommend a C7-T1 interlaminar epidural steroid injection.  She should continue with physical therapy and her home exercise program.  She should also continue Flexeril and gabapentin 100 mg at nighttime.       RTC in 2 weeks after procedure  Discharge Instructions were provided as documented in AVS summary.  The patient was in agreement with the assessment and plan.  All questions were answered.  There were no barriers to learning.         1. Trigger point of neck    2. Cervical facet syndrome    3. Foraminal stenosis of cervical region    4. DDD (degenerative disc disease), cervical    5. Myalgia    6. Chronic left-sided low back pain with left-sided sciatica    7. Facet syndrome, lumbar    8. Mechanical low back pain    9. Lumbar spondylosis    10. Degeneration of intervertebral disc of lumbosacral region with discogenic back pain and lower extremity pain    11. Bulge of lumbar disc without myelopathy    12. Lumbar foraminal stenosis    13. Lumbar disc herniation with radiculopathy        Alex B. Behar MD  Physical Medicine and Rehabilitation/Sports Medicine  [address] Cures Act Notice to Patient: Medical documents like this are made available to patients in the interest of transparency. However, be advised this is a medical document and it is intended as [id number] communication between your medical providers. This medical document may contain abbreviations, assessments,  medical data, and results or other terms that are unfamiliar. Medical documents are intended to carry relevant information, facts as evident, and the clinical opinion of the practitioner. As such, this medical document may be written in language that appears blunt or direct. You are encouraged to contact your medical provider and/or [facility] Patient Experience if you have any questions about this medical document.        [1]   Allergies  Allergen Reactions    Strawberry HIVES     strawberries    Penicillin V UNKNOWN

## 2025-02-18 ENCOUNTER — TELEPHONE (OUTPATIENT)
Dept: PHYSICAL MEDICINE AND REHAB | Facility: CLINIC | Age: 70
End: 2025-02-18

## 2025-02-18 DIAGNOSIS — M47.812 CERVICAL FACET SYNDROME: Primary | ICD-10-CM

## 2025-02-18 NOTE — TELEPHONE ENCOUNTER
Patient has been scheduled for Bilateral C5-C6 and C6-C7 facet joint injections with oral sedation on 3/5/25 at the [facility] with Dr. Behar.   Anesthesia type:  Oral sedation- Patient has been informed a  is required and Valium is provided at [facility].  Please note: The [facility] will call the business day prior to discuss the exact time/arrival and additional instructions for your appointment.  Patient was advised that if he/she does receive the covid vaccine it needs to be at least 2 weeks before or after the injection.  Medications and allergies reviewed.  Educated to hold NSAIDS (Aleve, Ibuprofen, Motrin, Advil) and anti-inflammatories (Meloxicam, Naproxen, Diclofenac, Celebrex) and for cervical injections must hold Multi-Vitamins, Vitamin E, Fish Oil/Omega-3.  Patient informed of [facility]'s  policy:  The patient will require transportation arrangements to and from the procedure, with the  present on site for the entire visit.  Without a , the appointment is subject to cancellation.    [facility] is located in the [facility] 1st floor [address] [place] [address].   may park in the yellow/purple parking lot.  Patient verbalized understanding and agrees with plan.  Scheduled in Epic: Yes  Scheduled in Surgical Case: Yes  Follow up appointment made: NOV: 3/31/2025 Behar, Alex, MD

## 2025-02-18 NOTE — TELEPHONE ENCOUNTER
Initiated authorization for BILATERAL C5-C6 and C6-C7 facet joint injections with oral sedation. CPT/HCPCS 94164-96, 66313 x's 2 dx:M47.812/M50.30 to be done at [facility] with [employer] portal.    Status: Approved  Reference/Authorization # [id number]  Tracking #[id number]  Valid: 2/18/25-5/16/25  Authorization is not a guarantee of payment and may be subject to review once claim is submitted.

## 2025-03-05 PROBLEM — M47.812 CERVICAL SPONDYLOSIS: Status: ACTIVE | Noted: 2025-03-05

## 2025-03-05 PROBLEM — M47.812 CERVICAL FACET SYNDROME: Status: ACTIVE | Noted: 2025-03-05

## 2025-03-05 PROBLEM — M54.2 NECK PAIN: Status: ACTIVE | Noted: 2025-03-05

## 2025-03-13 ENCOUNTER — MED REC SCAN ONLY (OUTPATIENT)
Dept: PHYSICAL MEDICINE AND REHAB | Facility: CLINIC | Age: 70
End: 2025-03-13

## 2025-03-31 ENCOUNTER — TELEMEDICINE (OUTPATIENT)
Dept: PHYSICAL MEDICINE AND REHAB | Facility: CLINIC | Age: 70
End: 2025-03-31
Payer: MEDICARE

## 2025-03-31 DIAGNOSIS — M54.42 CHRONIC LEFT-SIDED LOW BACK PAIN WITH LEFT-SIDED SCIATICA: ICD-10-CM

## 2025-03-31 DIAGNOSIS — M50.30 DDD (DEGENERATIVE DISC DISEASE), CERVICAL: ICD-10-CM

## 2025-03-31 DIAGNOSIS — M79.10 MYALGIA: ICD-10-CM

## 2025-03-31 DIAGNOSIS — M54.2 TRIGGER POINT OF NECK: ICD-10-CM

## 2025-03-31 DIAGNOSIS — G89.29 CHRONIC LEFT-SIDED LOW BACK PAIN WITH LEFT-SIDED SCIATICA: ICD-10-CM

## 2025-03-31 DIAGNOSIS — M47.812 CERVICAL FACET SYNDROME: Primary | ICD-10-CM

## 2025-03-31 NOTE — PROGRESS NOTES
[facility]  Video Visit Progress Note  CHIEF COMPLAINT:    Chief Complaint   Patient presents with    Injection    Imaging    Neck Pain       History of Present Illness:  The patient is a 69 year old  right-handed female with significant past medical history of osteoporosis who presents for follow up of her bilateral neck pain without radicular symptoms.  She is status post bilateral C5-C6 and C6-C7 facet joint injections on 3/5/2025. She is 80% improved with significant improvement in pain. She is no longer using a cervical collar. She has also been in therapy and felt it to be helpful. She has been taking gabapentin and Flexeril along with lidocaine patches which are also helpful.     PAST MEDICAL HISTORY:  Past Medical History:    COPD (chronic obstructive pulmonary disease) (HCC)    Fibromyalgia    Osteoporosis    PONV (postoperative nausea and vomiting)       SURGICAL HISTORY:  Past Surgical History:   Procedure Laterality Date    Hysterectomy         SOCIAL HISTORY:   Social History     Occupational History    Not on file   Tobacco Use    Smoking status: Former     Current packs/day: 0.00     Types: Cigarettes     Quit date: 2023     Years since quittin.1    Smokeless tobacco: Never    Tobacco comments:     I quit for 9 years, went back to smoking    Vaping Use    Vaping status: Never Used   Substance and Sexual Activity    Alcohol use: Not Currently    Drug use: Never    Sexual activity: Not on file       FAMILY HISTORY:   Family History   Problem Relation Age of Onset    Hypertension Father     Stroke Father     Dementia Mother     Cancer Maternal Uncle     Heart Disease Brother         Coronary heart disease, hypertension    Hypertension Sister     Hypertension Brother     Hypertension Brother         Hypertension       CURRENT MEDICATIONS:   Current Outpatient Medications   Medication Sig Dispense Refill    TURMERIC OR Take by mouth.       cyclobenzaprine 5 MG Oral Tab 5 mg 0.5-2 tablets three times per day as needed for spasms. Do not operate heavy machinery while on this medication as it may make you sleepy 90 tablet 0    GABAPENTIN 100 MG Oral Cap Take 1 capsule by mouth nightly 90 capsule 0    oxybutynin ER 10 MG Oral Tablet 24 Hr Take 1 tablet (10 mg total) by mouth daily. 90 tablet 1    alendronate 70 MG Oral Tab Take 1 tablet (70 mg total) by mouth once a week. 12 tablet 3       ALLERGIES:   Allergies[1]    REVIEW OF SYSTEMS:   Review of Systems   Constitutional: Negative.    HENT: Negative.    Eyes: Negative.    Respiratory: Negative.    Cardiovascular: Negative.    Gastrointestinal: Negative.    Genitourinary: Negative.    Musculoskeletal: As per HPI  Skin: Negative.    Neurological: As per HPI  Endo/Heme/Allergies: Negative.    Psychiatric/Behavioral: Negative.      All other systems reviewed and are negative. Pertinent positives and negatives noted in the HPI.        PHYSICAL EXAM:     There is no height or weight on file to calculate BMI.    General: No immediate distress  Head: Normocephalic/ Atraumatic  Eyes: Extra-occular movements intact  Ears/Nose/Throat:  External appearance identifies normal appearance without obvious deformity  Cardiovascular: No cyanosis, clubbing or edema  Respiratory: Non-labored respirations  Skin: No lesions noted   Neurological: alert & oriented x 3, attentive, able to follow commands, comprehention intact, spontaneous speech intact  Psychiatric: Mood and affect appropriate        Data  [facility] Lab Encounter on 02/12/2025   Component Date Value Ref Range Status    WBC 02/12/2025 5.3  4.0 - 11.0 x10(3) uL Final    RBC 02/12/2025 4.08  3.80 - 5.30 x10(6)uL Final    HGB 02/12/2025 12.6  12.0 - 16.0 g/dL Final    HCT 02/12/2025 37.1  35.0 - 48.0 % Final    MCV 02/12/2025 90.9  80.0 - 100.0 fL Final    MCH 02/12/2025 30.9  26.0 - 34.0 pg Final    MCHC 02/12/2025 34.0  31.0 - 37.0 g/dL Final    RDW-SD 02/12/2025 42.5   35.1 - 46.3 fL Final    RDW 02/12/2025 12.8  11.0 - 15.0 % Final    PLT 02/12/2025 202.0  150.0 - 450.0 10(3)uL Final    Neutrophil Absolute Prelim 02/12/2025 2.22  1.50 - 7.70 x10 (3) uL Final    Neutrophil Absolute 02/12/2025 2.22  1.50 - 7.70 x10(3) uL Final    Lymphocyte Absolute 02/12/2025 2.26  1.00 - 4.00 x10(3) uL Final    Monocyte Absolute 02/12/2025 0.64  0.10 - 1.00 x10(3) uL Final    Eosinophil Absolute 02/12/2025 0.10  0.00 - 0.70 x10(3) uL Final    Basophil Absolute 02/12/2025 0.03  0.00 - 0.20 x10(3) uL Final    Immature Granulocyte Absolute 02/12/2025 0.01  0.00 - 1.00 x10(3) uL Final    Neutrophil % 02/12/2025 42.1  % Final    Lymphocyte % 02/12/2025 43.0  % Final    Monocyte % 02/12/2025 12.2  % Final    Eosinophil % 02/12/2025 1.9  % Final    Basophil % 02/12/2025 0.6  % Final    Immature Granulocyte % 02/12/2025 0.2  % Final    Glucose 02/12/2025 83  70 - 99 mg/dL Final    Sodium 02/12/2025 144  136 - 145 mmol/L Final    Potassium 02/12/2025 3.4 (L)  3.5 - 5.1 mmol/L Final    Chloride 02/12/2025 107  98 - 112 mmol/L Final    CO2 02/12/2025 30.0  21.0 - 32.0 mmol/L Final    Anion Gap 02/12/2025 7  0 - 18 mmol/L Final    BUN 02/12/2025 11  9 - 23 mg/dL Final    Creatinine 02/12/2025 0.77  0.55 - 1.02 mg/dL Final    BUN/CREA Ratio 02/12/2025 14.3  10.0 - 20.0 Final    Calcium, Total 02/12/2025 9.2  8.7 - 10.4 mg/dL Final    Calculated Osmolality 02/12/2025 297 (H)  275 - 295 mOsm/kg Final    eGFR-Cr 02/12/2025 83  >=60 mL/min/1.73m2 Final    ALT 02/12/2025 <7 (L)  10 - 49 U/L Final    AST 02/12/2025 21  <34 U/L Final    Alkaline Phosphatase 02/12/2025 71  55 - 142 U/L Final    Bilirubin, Total 02/12/2025 0.4  0.2 - 1.1 mg/dL Final    Total Protein 02/12/2025 6.6  5.7 - 8.2 g/dL Final    Albumin 02/12/2025 4.3  3.2 - 4.8 g/dL Final    Globulin  02/12/2025 2.3  2.0 - 3.5 g/dL Final    A/G Ratio 02/12/2025 1.9  1.0 - 2.0 Final    Patient Fasting for CMP? 02/12/2025 No   Final    TSH 02/12/2025 2.328   0.550 - 4.780 uIU/mL Final   ]      Radiology Imaging:  I reviewed with the patient her X-ray and MRI of the cervical spine from 2/11/2025  MRI SPINE CERVICAL (CPT=72141)  Narrative: PROCEDURE: MRI SPINE CERVICAL (CPT=72141)     COMPARISON: [facility], XR CERVICAL SPINE AP LAT FLEX EXT EM (CPT=72050), 2/07/2025, 12:46 PM.     INDICATIONS: Cervical degenerative disc disease with cervical facet syndrome and radiculopathy; hyperreflexia; Morris reflex positive (R29.2, M54.12, M50.30, M47.812).     TECHNIQUE: A variety of imaging planes and parameters were utilized for visualization of suspected pathology.       FINDINGS:   ALIGNMENT: The anatomic cervical lordosis is interrupted by minimal anterolisthesis of C3 on C4 and slight retrolisthesis C5 on C6.  BONES: No fractures or osseous lesions are apparent. Multilevel anterior osteophyte formation is demonstrated, most notably at the C4-C7 levels.  CORD: Normal caliber, contour, and signal intensity.    CRANIOCERVICAL AREA: Normal foramen magnum without Chiari malformation.    PARASPINAL AREA: No visible mass.    OTHER: There is no visible swelling of the prevertebral soft tissues. Nodular mucosal thickening and mucous retention cyst formation of the right maxillary sinus is appreciated.     CERVICAL DISC LEVELS:  C2-C3: A tiny central posterior disc protrusion is appreciated. There is no significant spinal canal or foraminal narrowing.  C3-C4: Mild disc degeneration is observed, and there is a central disc protrusion. Mild facet arthropathy is manifested without significant spinal canal or foraminal encroachment.  C4-C5: A tiny central disc protrusion is noted. There is bilateral uncovertebral hypertrophy. Left worse than right facet arthropathy is appreciated. There does not appear to be significant spinal canal or foraminal compromise.  C5-C6: Posterior disc osteophyte complex formation is apparent with prominent left larger than right foraminal  protrusions and left greater than right uncovertebral hypertrophy. These findings cause mild spinal canal narrowing with severe bilateral   foraminal impingement.  C6-C7: Posterior disc osteophyte complex formation is seen with prominent central and left larger than right foraminal protrusions. There is left greater than right uncovertebral hypertrophy. These findings contribute to mild spinal canal narrowing   abutment (but no indentation) of the ventral cord contour.  C7-T1: Left-sided perineural cysts are noted; there is no significant disc degeneration, facet abnormality, spinal canal stenosis, or foraminal stenosis.                 Impression: CONCLUSION:   1. Degenerative disc disease, particularly at C5-C6 and C6-C7, where there is mild spinal canal narrowing.     2. Severe bilateral foraminal compromise at C5-C6.     3. Negative for abnormal intrinsic cord signal intensity.     4. No acute osseous injury of the cervical spine is apparent.     5. Lesser incidental findings as above.           elm-remote.        Dictated by (CST): Carl Modi MD on 2/11/2025 at 9:33 PM       Finalized by (CST): Carl Modi MD on 2/11/2025 at 9:40 PM          XR CERVICAL SPINE AP LAT FLEX EXT (CPT=72050)  Narrative: PROCEDURE: XR CERVICAL SPINE AP LAT FLEX EXT EM (CPT=72050)     COMPARISON: None.     INDICATIONS: Chronic neck pain. Evaluation for  Cervical radiculopathy, DDD (degenerative disc disease), cervical , Cervical facet syndrom*     TECHNIQUE: Cervical spine radiographs with flexion and extension views. (4 views)     FINDINGS:         ALIGNMENT: Normal alignment.    ATLANTOAXIAL: Normal odontoid and atlantoaxial joints.    VERTEBRAL BODIES:    No acute fracture or malalignment.  There is multilevel degenerative change with marginal osteophyte formation and facet arthropathy.  DISC SPACES:   Multilevel disc space narrowing most significant at C5-C6 and C6-C7.  PREVERTEBRAL SOFT TISSUES: Negative.     FLEXION/EXTENSION: Limited range of motion.  No subluxation during flexion and extension.    OTHER: Negative.                Impression: CONCLUSION:      Moderate cervical spine degenerative change most significant C5-C6 and C6-C7.           Dictated by (CST): Zia Wood MD on 2/11/2025 at 4:11 PM       Finalized by (CST): Zia Wood MD on 2/11/2025 at 4:16 PM              ASSESSMENT AND PLAN:  Taya is a pleasant 69-year-old female presents for follow-up of her bilateral neck pain status post bilateral C5-C6 and C6-C7 facet joint injections on 3/5/2025 with 80% improvement in her symptoms.  She is now able to sleep better and is no longer using a cervical collar.  She continues to take gabapentin 100 mg at nighttime and Flexeril 5 mg at nighttime which she finds to be helpful.  I am recommending she continue working with physical therapy as she has about 8 sessions left.  If her symptoms continue, we can consider a medial branch block followed by radiofrequency ablation.  She will follow-up with me in about 6 to 8 weeks.       RTC in 6 to 8 weeks  Discharge Instructions were provided as documented in AVS summary.  The patient was in agreement with the assessment and plan.  All questions were answered.  There were no barriers to learning.         1. Cervical facet syndrome    2. Trigger point of neck    3. DDD (degenerative disc disease), cervical    4. Myalgia    5. Chronic left-sided low back pain with left-sided sciatica        Alex B. Behar MD  Physical Medicine and Rehabilitation/Sports Medicine  [address] Cures Act Notice to Patient: Medical documents like this are made available to patients in the interest of transparency. However, be advised this is a medical document and it is intended as [id number] communication between your medical providers. This medical document may contain abbreviations, assessments, medical data, and results or other terms that  are unfamiliar. Medical documents are intended to carry relevant information, facts as evident, and the clinical opinion of the practitioner. As such, this medical document may be written in language that appears blunt or direct. You are encouraged to contact your medical provider and/or [facility] Patient Experience if you have any questions about this medical document.        [1]   Allergies  Allergen Reactions    Strawberry HIVES     strawberries    Penicillin V UNKNOWN

## 2025-03-31 NOTE — PATIENT INSTRUCTIONS
1) Continue with physical therapy  2) Continue gabapentin 100 mg at night  3) Continue the Flexeril at night  4) Use patches only if needed  5) Lets touch base in about 6-8 weeks (lets plan for the second week of may)

## 2025-04-03 ENCOUNTER — TELEPHONE (OUTPATIENT)
Dept: PHYSICAL MEDICINE AND REHAB | Facility: CLINIC | Age: 70
End: 2025-04-03

## 2025-04-03 NOTE — TELEPHONE ENCOUNTER
FREDY to schedule follow up with Dr. Alex Behar. Per Dr. Behar, \" schedule follow up for the second week of May, please\"     PSR- okay to use MD approval.

## 2025-04-10 RX ORDER — OXYBUTYNIN CHLORIDE 10 MG/1
10 TABLET, EXTENDED RELEASE ORAL DAILY
Qty: 90 TABLET | Refills: 0 | Status: SHIPPED | OUTPATIENT
Start: 2025-04-10

## 2025-05-07 ENCOUNTER — MED REC SCAN ONLY (OUTPATIENT)
Dept: PHYSICAL MEDICINE AND REHAB | Facility: CLINIC | Age: 70
End: 2025-05-07

## 2025-05-08 ENCOUNTER — OFFICE VISIT (OUTPATIENT)
Dept: PHYSICAL MEDICINE AND REHAB | Facility: CLINIC | Age: 70
End: 2025-05-08
Payer: MEDICARE

## 2025-05-08 VITALS
DIASTOLIC BLOOD PRESSURE: 78 MMHG | BODY MASS INDEX: 22.22 KG/M2 | HEIGHT: 69 IN | OXYGEN SATURATION: 97 % | HEART RATE: 74 BPM | SYSTOLIC BLOOD PRESSURE: 122 MMHG | WEIGHT: 150 LBS

## 2025-05-08 DIAGNOSIS — M79.10 MYALGIA: ICD-10-CM

## 2025-05-08 DIAGNOSIS — M54.12 CERVICAL RADICULOPATHY: ICD-10-CM

## 2025-05-08 DIAGNOSIS — M48.02 FORAMINAL STENOSIS OF CERVICAL REGION: ICD-10-CM

## 2025-05-08 DIAGNOSIS — M50.30 DDD (DEGENERATIVE DISC DISEASE), CERVICAL: ICD-10-CM

## 2025-05-08 DIAGNOSIS — M95.8 WINGING OF SCAPULA: Primary | ICD-10-CM

## 2025-05-08 DIAGNOSIS — M51.369 BULGE OF LUMBAR DISC WITHOUT MYELOPATHY: ICD-10-CM

## 2025-05-08 DIAGNOSIS — M47.816 LUMBAR SPONDYLOSIS: ICD-10-CM

## 2025-05-08 DIAGNOSIS — M54.42 CHRONIC LEFT-SIDED LOW BACK PAIN WITH LEFT-SIDED SCIATICA: ICD-10-CM

## 2025-05-08 DIAGNOSIS — G89.29 CHRONIC LEFT-SIDED LOW BACK PAIN WITH LEFT-SIDED SCIATICA: ICD-10-CM

## 2025-05-08 DIAGNOSIS — M48.061 LUMBAR FORAMINAL STENOSIS: ICD-10-CM

## 2025-05-08 DIAGNOSIS — M47.812 CERVICAL FACET SYNDROME: ICD-10-CM

## 2025-05-08 DIAGNOSIS — M47.816 FACET SYNDROME, LUMBAR: ICD-10-CM

## 2025-05-08 DIAGNOSIS — M54.2 TRIGGER POINT OF NECK: ICD-10-CM

## 2025-05-08 DIAGNOSIS — M51.372 DEGENERATION OF INTERVERTEBRAL DISC OF LUMBOSACRAL REGION WITH DISCOGENIC BACK PAIN AND LOWER EXTREMITY PAIN: ICD-10-CM

## 2025-05-08 DIAGNOSIS — M54.59 MECHANICAL LOW BACK PAIN: ICD-10-CM

## 2025-05-08 DIAGNOSIS — M51.16 LUMBAR DISC HERNIATION WITH RADICULOPATHY: ICD-10-CM

## 2025-05-08 PROCEDURE — 3008F BODY MASS INDEX DOCD: CPT | Performed by: PHYSICAL MEDICINE & REHABILITATION

## 2025-05-08 PROCEDURE — 1160F RVW MEDS BY RX/DR IN RCRD: CPT | Performed by: PHYSICAL MEDICINE & REHABILITATION

## 2025-05-08 PROCEDURE — 3074F SYST BP LT 130 MM HG: CPT | Performed by: PHYSICAL MEDICINE & REHABILITATION

## 2025-05-08 PROCEDURE — 3078F DIAST BP <80 MM HG: CPT | Performed by: PHYSICAL MEDICINE & REHABILITATION

## 2025-05-08 PROCEDURE — 99214 OFFICE O/P EST MOD 30 MIN: CPT | Performed by: PHYSICAL MEDICINE & REHABILITATION

## 2025-05-08 PROCEDURE — 1159F MED LIST DOCD IN RCRD: CPT | Performed by: PHYSICAL MEDICINE & REHABILITATION

## 2025-05-08 PROCEDURE — 1125F AMNT PAIN NOTED PAIN PRSNT: CPT | Performed by: PHYSICAL MEDICINE & REHABILITATION

## 2025-05-08 NOTE — PROGRESS NOTES
The following individual(s) verbally consented to be recorded using ambient AI listening technology and understand that they can each withdraw their consent to this listening technology at any point by asking the clinician to turn off or pause the recording:    Patient name: Taya Alas Teo  Additional names:

## 2025-05-08 NOTE — PATIENT INSTRUCTIONS
1) Continue therapy but try going down to once per week. I want them focusing on the right shoulder blade and helping it settle down  2) Lets keep an eye on the neck pain. If the pain returns, and continues to be closer tot he ehad, then we will consider a BILATERAL C3-C4 and C4-C5 facet joint injections. If the pain is lower, then we will repeat the last injections we did  3) Continue gabapentin 100 mg 1-2 times per day  4) Continue Flexeril if needed  5) Lets touch base in about 6 weeks

## 2025-05-08 NOTE — PROGRESS NOTES
[facility]  Progress Note    CHIEF COMPLAINT:    Chief Complaint   Patient presents with    Follow - Up     LOV 2025. Pt here for f/u on BL C5/6, C6/7 facet joint injections completed 3/5/25 and reports 75% improvement. Pain is 3/10. Admits N/T in 4/5th digits of R hand. Denies weakness. Taking cyclobenzaprine, gabapentin, tylenol. Completed PT with relief.        History of Present Illness  Taya Bruce is a 69 year old female who presents with neck pain and associated symptoms.    She experiences a burning and stinging sensation primarily on the top of her head. On a particularly bad day, the pain was severe on one side, but pressing on a soft spot on her head alleviated the pain.    Her neck pain is rated as 3 out of 10 today, indicating improvement.  She is status post bilateral C5-C6 and C6-C7 facet joint injections in February with at least 75% improvement in her symptoms.  She rates the discomfort about a 3 out of 10.  She is mostly complaining of upper neck and head pain.  She has also noted that her right shoulder blade protrudes out.  There is no shooting pain into her arms, but during a massage session, her right 4th and 5th fingers and arm reacted with tingling and involuntary arm movement.    She is currently taking gabapentin, 100 mg, one to two times per day, and a muscle relaxer as needed, which she finds helpful.    She is undergoing physical therapy at [place] and is actively working to improve her posture.       PAST MEDICAL HISTORY:  Past Medical History[1]    SURGICAL HISTORY:  Past Surgical History[2]    SOCIAL HISTORY:   Social History     Occupational History    Not on file   Tobacco Use    Smoking status: Former     Current packs/day: 0.00     Types: Cigarettes     Quit date: 2023     Years since quittin.2    Smokeless tobacco: Never    Tobacco comments:     I quit for 9 years, went back to smoking    Vaping Use     Vaping status: Never Used   Substance and Sexual Activity    Alcohol use: Not Currently    Drug use: Never    Sexual activity: Not on file       FAMILY HISTORY:   Family History[3]    CURRENT MEDICATIONS:   Current Medications[4]    ALLERGIES:   Allergies[5]    REVIEW OF SYSTEMS:   Review of Systems   Constitutional: Negative.    HENT: Negative.    Eyes: Negative.    Respiratory: Negative.    Cardiovascular: Negative.    Gastrointestinal: Negative.    Genitourinary: Negative.    Musculoskeletal: As per HPI  Skin: Negative.    Neurological: As per HPI  Endo/Heme/Allergies: Negative.    Psychiatric/Behavioral: Negative.      All other systems reviewed and are negative. Pertinent positives and negatives noted in the HPI.    PHYSICAL EXAM:   /78   Pulse 74   Ht 69\"   Wt 150 lb (68 kg)   SpO2 97%   BMI 22.15 kg/m²     Body mass index is 22.15 kg/m².      General: No immediate distress  Head: Normocephalic/ Atraumatic  Eyes: Extra-occular movements intact.   Ears: No auricular hematoma or deformities  Mouth: No lesions or ulcerations  Heart: peripheral pulses intact. Normal capillary refill.   Lungs: Non-labored respirations  Abdomen: No abdominal guarding  Extremities: No lower extremity edema bilaterally   Skin: No lesions noted.   Cognition: alert & oriented x 3, attentive, able to follow 2 step commands, comprehension intact, spontaneous speech intact  Motor:    Musculoskeletal:    Medial winging of the right scapula.  Tenderness to palpation throughout the cervical paraspinals and upper facet joints.    Data  EE Lab Encounter on 02/12/2025   Component Date Value Ref Range Status    WBC 02/12/2025 5.3  4.0 - 11.0 x10(3) uL Final    RBC 02/12/2025 4.08  3.80 - 5.30 x10(6)uL Final    HGB 02/12/2025 12.6  12.0 - 16.0 g/dL Final    HCT 02/12/2025 37.1  35.0 - 48.0 % Final    MCV 02/12/2025 90.9  80.0 - 100.0 fL Final    MCH 02/12/2025 30.9  26.0 - 34.0 pg Final    MCHC 02/12/2025 34.0  31.0 - 37.0 g/dL Final     RDW-SD 02/12/2025 42.5  35.1 - 46.3 fL Final    RDW 02/12/2025 12.8  11.0 - 15.0 % Final    PLT 02/12/2025 202.0  150.0 - 450.0 10(3)uL Final    Neutrophil Absolute Prelim 02/12/2025 2.22  1.50 - 7.70 x10 (3) uL Final    Neutrophil Absolute 02/12/2025 2.22  1.50 - 7.70 x10(3) uL Final    Lymphocyte Absolute 02/12/2025 2.26  1.00 - 4.00 x10(3) uL Final    Monocyte Absolute 02/12/2025 0.64  0.10 - 1.00 x10(3) uL Final    Eosinophil Absolute 02/12/2025 0.10  0.00 - 0.70 x10(3) uL Final    Basophil Absolute 02/12/2025 0.03  0.00 - 0.20 x10(3) uL Final    Immature Granulocyte Absolute 02/12/2025 0.01  0.00 - 1.00 x10(3) uL Final    Neutrophil % 02/12/2025 42.1  % Final    Lymphocyte % 02/12/2025 43.0  % Final    Monocyte % 02/12/2025 12.2  % Final    Eosinophil % 02/12/2025 1.9  % Final    Basophil % 02/12/2025 0.6  % Final    Immature Granulocyte % 02/12/2025 0.2  % Final    Glucose 02/12/2025 83  70 - 99 mg/dL Final    Sodium 02/12/2025 144  136 - 145 mmol/L Final    Potassium 02/12/2025 3.4 (L)  3.5 - 5.1 mmol/L Final    Chloride 02/12/2025 107  98 - 112 mmol/L Final    CO2 02/12/2025 30.0  21.0 - 32.0 mmol/L Final    Anion Gap 02/12/2025 7  0 - 18 mmol/L Final    BUN 02/12/2025 11  9 - 23 mg/dL Final    Creatinine 02/12/2025 0.77  0.55 - 1.02 mg/dL Final    BUN/CREA Ratio 02/12/2025 14.3  10.0 - 20.0 Final    Calcium, Total 02/12/2025 9.2  8.7 - 10.4 mg/dL Final    Calculated Osmolality 02/12/2025 297 (H)  275 - 295 mOsm/kg Final    eGFR-Cr 02/12/2025 83  >=60 mL/min/1.73m2 Final    ALT 02/12/2025 <7 (L)  10 - 49 U/L Final    AST 02/12/2025 21  <34 U/L Final    Alkaline Phosphatase 02/12/2025 71  55 - 142 U/L Final    Bilirubin, Total 02/12/2025 0.4  0.2 - 1.1 mg/dL Final    Total Protein 02/12/2025 6.6  5.7 - 8.2 g/dL Final    Albumin 02/12/2025 4.3  3.2 - 4.8 g/dL Final    Globulin  02/12/2025 2.3  2.0 - 3.5 g/dL Final    A/G Ratio 02/12/2025 1.9  1.0 - 2.0 Final    Patient Fasting for CMP? 02/12/2025 No   Final     TSH 02/12/2025 2.328  0.550 - 4.780 uIU/mL Final   ]      Radiology Imaging:  I reviewed with the patient her MRI of the cervical spine   MRI SPINE CERVICAL (CPT=72141)  Narrative: PROCEDURE: MRI SPINE CERVICAL (CPT=72141)     COMPARISON: [facility], XR CERVICAL SPINE AP LAT FLEX EXT EM (CPT=72050), 2/07/2025, 12:46 PM.     INDICATIONS: Cervical degenerative disc disease with cervical facet syndrome and radiculopathy; hyperreflexia; Morris reflex positive (R29.2, M54.12, M50.30, M47.812).     TECHNIQUE: A variety of imaging planes and parameters were utilized for visualization of suspected pathology.       FINDINGS:   ALIGNMENT: The anatomic cervical lordosis is interrupted by minimal anterolisthesis of C3 on C4 and slight retrolisthesis C5 on C6.  BONES: No fractures or osseous lesions are apparent. Multilevel anterior osteophyte formation is demonstrated, most notably at the C4-C7 levels.  CORD: Normal caliber, contour, and signal intensity.    CRANIOCERVICAL AREA: Normal foramen magnum without Chiari malformation.    PARASPINAL AREA: No visible mass.    OTHER: There is no visible swelling of the prevertebral soft tissues. Nodular mucosal thickening and mucous retention cyst formation of the right maxillary sinus is appreciated.     CERVICAL DISC LEVELS:  C2-C3: A tiny central posterior disc protrusion is appreciated. There is no significant spinal canal or foraminal narrowing.  C3-C4: Mild disc degeneration is observed, and there is a central disc protrusion. Mild facet arthropathy is manifested without significant spinal canal or foraminal encroachment.  C4-C5: A tiny central disc protrusion is noted. There is bilateral uncovertebral hypertrophy. Left worse than right facet arthropathy is appreciated. There does not appear to be significant spinal canal or foraminal compromise.  C5-C6: Posterior disc osteophyte complex formation is apparent with prominent left larger than right foraminal  protrusions and left greater than right uncovertebral hypertrophy. These findings cause mild spinal canal narrowing with severe bilateral   foraminal impingement.  C6-C7: Posterior disc osteophyte complex formation is seen with prominent central and left larger than right foraminal protrusions. There is left greater than right uncovertebral hypertrophy. These findings contribute to mild spinal canal narrowing   abutment (but no indentation) of the ventral cord contour.  C7-T1: Left-sided perineural cysts are noted; there is no significant disc degeneration, facet abnormality, spinal canal stenosis, or foraminal stenosis.                 Impression: CONCLUSION:   1. Degenerative disc disease, particularly at C5-C6 and C6-C7, where there is mild spinal canal narrowing.     2. Severe bilateral foraminal compromise at C5-C6.     3. Negative for abnormal intrinsic cord signal intensity.     4. No acute osseous injury of the cervical spine is apparent.     5. Lesser incidental findings as above.           elm-remote.        Dictated by (CST): Carl Modi MD on 2/11/2025 at 9:33 PM       Finalized by (CST): Carl Modi MD on 2/11/2025 at 9:40 PM          XR CERVICAL SPINE AP LAT FLEX EXT (CPT=72050)  Narrative: PROCEDURE: XR CERVICAL SPINE AP LAT FLEX EXT EM (CPT=72050)     COMPARISON: None.     INDICATIONS: Chronic neck pain. Evaluation for  Cervical radiculopathy, DDD (degenerative disc disease), cervical , Cervical facet syndrom*     TECHNIQUE: Cervical spine radiographs with flexion and extension views. (4 views)     FINDINGS:         ALIGNMENT: Normal alignment.    ATLANTOAXIAL: Normal odontoid and atlantoaxial joints.    VERTEBRAL BODIES:    No acute fracture or malalignment.  There is multilevel degenerative change with marginal osteophyte formation and facet arthropathy.  DISC SPACES:   Multilevel disc space narrowing most significant at C5-C6 and C6-C7.  PREVERTEBRAL SOFT TISSUES: Negative.     FLEXION/EXTENSION: Limited range of motion.  No subluxation during flexion and extension.    OTHER: Negative.                Impression: CONCLUSION:      Moderate cervical spine degenerative change most significant C5-C6 and C6-C7.           Dictated by (CST): Zia Wood MD on 2/11/2025 at 4:11 PM       Finalized by (CST): Zia Wood MD on 2/11/2025 at 4:16 PM              ASSESSMENT AND PLAN:  Assessment & Plan  Cervical facet syndrome  Chronic neck pain with burning and stinging sensation, rated 3/10, no arm radiation. Possible myofascial involvement. Physical therapy beneficial. Facet joint injections considered if pain worsens. She prefers monitoring symptoms before injections.  - Continue physical therapy, focus on right shoulder blade and posture correction.  - Consider bilateral super C4 and C4, C5-C6 facet joint injection if pain worsens and is closer to the head.  - Repeat previous injections if pain is lower.  - Continue gabapentin 100 mg one to two times per day.  - Continue cyclobenzaprine as needed.    Myalgia  Muscle soreness in neck and shoulder region. Physical therapy addressing muscle strength and posture. She is aware of posture and actively working on improvements.  - Continue physical therapy with focus on strengthening muscles to support shoulder blade.    Trigger point of neck  Trigger points in neck region contributing to pain and discomfort. Physical therapy addressing these areas.  - Continue physical therapy with focus on trigger point release and muscle strengthening.    Medial scapular winging  -May be related to long thoracic nerve injury versus cervical radiculopathy given her foraminal narrowing at C5-C6.  - Continue with physical therapy      RTC in 6 weeks  Discharge Instructions were provided as documented in AVS summary.  The patient was in agreement with the assessment and plan.  All questions were answered.  There were no barriers to learning.         1. Winging  of scapula    2. Cervical facet syndrome    3. Trigger point of neck    4. DDD (degenerative disc disease), cervical    5. Myalgia    6. Chronic left-sided low back pain with left-sided sciatica    7. Foraminal stenosis of cervical region    8. Cervical radiculopathy        Alex B. Behar MD  Physical Medicine and Rehabilitation/Sports Medicine  [address] Cures Act Notice to Patient: Medical documents like this are made available to patients in the interest of transparency. However, be advised this is a medical document and it is intended as [id number] communication between your medical providers. This medical document may contain abbreviations, assessments, medical data, and results or other terms that are unfamiliar. Medical documents are intended to carry relevant information, facts as evident, and the clinical opinion of the practitioner. As such, this medical document may be written in language that appears blunt or direct. You are encouraged to contact your medical provider and/or [facility] if you have any questions about this medical document.   Abridge tool was used for dictation purposes only and the patient was not recorded at any point during the visit.              [1]   Past Medical History:   Allergic rhinitis    Arthritis    Calculus of kidney    Colon polyp    Constipation    COPD (chronic obstructive pulmonary disease) (HCC)    Diverticular disease    Esophageal reflux    Fibromyalgia    IBS (irritable bowel syndrome)    Osteoarthritis    Osteoporosis    PONV (postoperative nausea and vomiting)   [2]   Past Surgical History:  Procedure Laterality Date    Appendectomy      Cholecystectomy      Colonoscopy      D & c      Hernia surgery      Hysterectomy      Other surgical history      Total abdom hysterectomy     [3]   Family History  Problem Relation Age of Onset    Hypertension Father     Stroke Father     Dementia Mother     Depression  Mother     Heart Disorder Mother     Pulmonary Disease Mother     Cancer Maternal Uncle     Heart Disease Brother         Coronary heart disease, hypertension    Hypertension Brother         Hypertension    Heart Disorder Brother         Coronary Artery Disease    Hypertension Sister     Diabetes Sister     Hypertension Brother     Hypertension Brother         Hypertension    Heart Disorder Maternal Grandmother     Heart Disorder Paternal Grandfather     Diabetes Paternal Grandfather     Infectious Disease Brother     Infectious Disease Brother    [4]   Current Outpatient Medications   Medication Sig Dispense Refill    OXYBUTYNIN ER 10 MG Oral Tablet 24 Hr Take 1 tablet by mouth daily 90 tablet 0    TURMERIC OR Take by mouth.      cyclobenzaprine 5 MG Oral Tab 5 mg 0.5-2 tablets three times per day as needed for spasms. Do not operate heavy machinery while on this medication as it may make you sleepy 90 tablet 0    GABAPENTIN 100 MG Oral Cap Take 1 capsule by mouth nightly 90 capsule 0    alendronate 70 MG Oral Tab Take 1 tablet (70 mg total) by mouth once a week. 12 tablet 3   [5]   Allergies  Allergen Reactions    Strawberry HIVES     strawberries    Penicillin V UNKNOWN

## 2025-05-09 RX ORDER — CYCLOBENZAPRINE HCL 5 MG
TABLET ORAL
Qty: 90 TABLET | Refills: 0 | Status: SHIPPED | OUTPATIENT
Start: 2025-05-09

## 2025-05-09 NOTE — TELEPHONE ENCOUNTER
Refill Request    Medication request: cyclobenzaprine 5 MG Oral Tab  5 mg 0.5-2 tablets three times per day as needed for spasms. Do not operate heavy machinery while on this medication as it may make you sleepy     LOV:5/8/2025 Behar, Alex, MD   Due back to clinic per last office note:  \"RTC in 6 weeks \"  NOV: 7/14/2025 Behar, Alex, [place]      [place] 2/17/25 #90 - 15 day supply    Urine drug screen (if applicable): n/a  Pain contract: n/a    LOV plan (if weaning or changing medications): Per Dr.Behar at last office visit: \"- Continue gabapentin 100 mg one to two times per day.  - Continue cyclobenzaprine as needed.\"

## 2025-05-16 RX ORDER — GABAPENTIN 100 MG/1
100 CAPSULE ORAL 2 TIMES DAILY
Qty: 60 CAPSULE | Refills: 0 | Status: SHIPPED | OUTPATIENT
Start: 2025-05-16

## 2025-05-16 NOTE — TELEPHONE ENCOUNTER
Refill Request    Medication request: GABAPENTIN 100 MG Oral Cap     LOV:5/8/2025 Behar, Alex, MD   Due back to clinic per last office note:  RTC in 6 weeks (around 6/22/25)  NOV: 7/14/2025 Behar, Alex, MD      [place]: 4/6/25  #60    Urine drug screen (if applicable): N/A  Pain contract: N/A    LOV plan (if weaning or changing medications): Continue gabapentin 100 mg one to two times per day.

## 2025-06-11 ENCOUNTER — NURSE TRIAGE (OUTPATIENT)
Age: 70
End: 2025-06-11

## 2025-06-11 NOTE — TELEPHONE ENCOUNTER
Pt is calling wanting to speak to nurse. Pt states that yesterday started having phelms and now fever.       Please call and advise

## 2025-06-12 RX ORDER — AZITHROMYCIN 250 MG/1
TABLET, FILM COATED ORAL
Qty: 6 TABLET | Refills: 0 | Status: SHIPPED | OUTPATIENT
Start: 2025-06-12 | End: 2025-06-17

## 2025-06-12 NOTE — TELEPHONE ENCOUNTER
Patient want a Zpack  she thinks its bronchitis. She declines ICC.   Reason for Disposition   MILD difficulty breathing (e.g., minimal/no SOB at rest, SOB with walking, pulse <100) and still present when not coughing    Answer Assessment - Initial Assessment Questions  1. ONSET: \"When did the cough begin?\"       6/11/25  2. SEVERITY: \"How bad is the cough today?\"       Bad  3. SPUTUM: \"Describe the color of your sputum\" (none, dry cough; clear, white, yellow, green)      Yellow   4. HEMOPTYSIS: \"Are you coughing up any blood?\" If so ask: \"How much?\" (flecks, streaks, tablespoons, etc.)      No   5. DIFFICULTY BREATHING: \"Are you having difficulty breathing?\" If Yes, ask: \"How bad is it?\" (e.g., mild, moderate, severe)     - MILD: No SOB at rest, mild SOB with walking, speaks normally in sentences, can lie down, no retractions, pulse < 100.     - MODERATE: SOB at rest, SOB with minimal exertion and prefers to sit, cannot lie down flat, speaks in phrases, mild retractions, audible wheezing, pulse 100-120.     - SEVERE: Very SOB at rest, speaks in single words, struggling to breathe, sitting hunched forward, retractions, pulse > 120       Mild   6. FEVER: \"Do you have a fever?\" If Yes, ask: \"What is your temperature, how was it measured, and when did it start?\"      101.3 forehead 6/11/25  7. CARDIAC HISTORY: \"Do you have any history of heart disease?\" (e.g., heart attack, congestive heart failure)       No   8. LUNG HISTORY: \"Do you have any history of lung disease?\"  (e.g., pulmonary embolus, asthma, emphysema)      COPD  9. PE RISK FACTORS: \"Do you have a history of blood clots?\" (or: recent major surgery, recent prolonged travel, bedridden)      No   10. OTHER SYMPTOMS: \"Do you have any other symptoms?\" (e.g., runny nose, wheezing, chest pain)        Wheezing  11. PREGNANCY: \"Is there any chance you are pregnant?\" \"When was your last menstrual period?\"        NA  12. TRAVEL: \"Have you traveled out of the country in  the last month?\" (e.g., travel history, exposures)        NO    Protocols used: Cough-A-[place]

## 2025-06-13 RX ORDER — GABAPENTIN 100 MG/1
100 CAPSULE ORAL 2 TIMES DAILY
Qty: 60 CAPSULE | Refills: 0 | Status: SHIPPED | OUTPATIENT
Start: 2025-06-13

## 2025-06-13 NOTE — TELEPHONE ENCOUNTER
Refill Request    Medication request: GABAPENTIN 100 MG Oral Cap     LOV:5/8/2025 Behar, Alex, MD   Due back to clinic per last office note:  Follow up in about 6 weeks  NOV: 7/14/2025 Behar, Alex, [place]      [place] 05/16/2025 #60 (30 day supply)    Urine drug screen (if applicable): n/a  Pain contract: n/a    LOV plan (if weaning or changing medications): Continue gabapentin 100 mg one to two times per day.

## 2025-06-16 RX ORDER — ALENDRONATE SODIUM 70 MG/1
70 TABLET ORAL WEEKLY
Qty: 12 TABLET | Refills: 3 | Status: SHIPPED | OUTPATIENT
Start: 2025-06-16

## 2025-07-08 ENCOUNTER — MED REC SCAN ONLY (OUTPATIENT)
Dept: PHYSICAL MEDICINE AND REHAB | Facility: CLINIC | Age: 70
End: 2025-07-08

## 2025-07-14 ENCOUNTER — OFFICE VISIT (OUTPATIENT)
Dept: PHYSICAL MEDICINE AND REHAB | Facility: CLINIC | Age: 70
End: 2025-07-14
Payer: MEDICARE

## 2025-07-14 VITALS — HEIGHT: 69 IN | WEIGHT: 150 LBS | BODY MASS INDEX: 22.22 KG/M2

## 2025-07-14 DIAGNOSIS — M48.061 LUMBAR FORAMINAL STENOSIS: ICD-10-CM

## 2025-07-14 DIAGNOSIS — M47.816 FACET SYNDROME, LUMBAR: ICD-10-CM

## 2025-07-14 DIAGNOSIS — M51.16 LUMBAR DISC HERNIATION WITH RADICULOPATHY: ICD-10-CM

## 2025-07-14 DIAGNOSIS — M51.369 BULGE OF LUMBAR DISC WITHOUT MYELOPATHY: ICD-10-CM

## 2025-07-14 DIAGNOSIS — M79.10 MYALGIA: ICD-10-CM

## 2025-07-14 DIAGNOSIS — M54.59 MECHANICAL LOW BACK PAIN: ICD-10-CM

## 2025-07-14 DIAGNOSIS — M50.30 DDD (DEGENERATIVE DISC DISEASE), CERVICAL: ICD-10-CM

## 2025-07-14 DIAGNOSIS — M51.372 DEGENERATION OF INTERVERTEBRAL DISC OF LUMBOSACRAL REGION WITH DISCOGENIC BACK PAIN AND LOWER EXTREMITY PAIN: ICD-10-CM

## 2025-07-14 DIAGNOSIS — G89.29 CHRONIC LEFT-SIDED LOW BACK PAIN WITH LEFT-SIDED SCIATICA: ICD-10-CM

## 2025-07-14 DIAGNOSIS — M47.816 LUMBAR SPONDYLOSIS: ICD-10-CM

## 2025-07-14 DIAGNOSIS — M54.2 TRIGGER POINT OF NECK: ICD-10-CM

## 2025-07-14 DIAGNOSIS — M47.812 CERVICAL FACET SYNDROME: ICD-10-CM

## 2025-07-14 DIAGNOSIS — M54.42 CHRONIC LEFT-SIDED LOW BACK PAIN WITH LEFT-SIDED SCIATICA: ICD-10-CM

## 2025-07-14 DIAGNOSIS — M48.02 FORAMINAL STENOSIS OF CERVICAL REGION: ICD-10-CM

## 2025-07-14 RX ORDER — CYCLOBENZAPRINE HCL 5 MG
TABLET ORAL
Qty: 90 TABLET | Refills: 0 | Status: SHIPPED | OUTPATIENT
Start: 2025-07-14

## 2025-07-14 RX ORDER — GABAPENTIN 100 MG/1
100 CAPSULE ORAL 2 TIMES DAILY
Qty: 60 CAPSULE | Refills: 0 | Status: SHIPPED | OUTPATIENT
Start: 2025-07-14

## 2025-07-14 NOTE — TELEPHONE ENCOUNTER
Refill Request    Medication request: GABAPENTIN 100 MG Oral Cap. TAKE 1 CAPSULE BY MOUTH TWO TIMES DAILY     LOV:5/8/2025 Behar, Alex, MD   Due back to clinic per last office note: Per Dr. Behar: \"RTC in 6 weeks.\"  NOV: 7/14/2025 Behar, Alex, MD      [place]: 06/14/2025 #60    Urine drug screen (if applicable): n/a  Pain contract: n/a    LOV plan (if weaning or changing medications): Per Dr. Behar: \"- Continue gabapentin 100 mg one to two times per day.\"

## 2025-07-14 NOTE — PROGRESS NOTES
[facility]  Progress Note    CHIEF COMPLAINT:    Chief Complaint   Patient presents with    Follow - Up     LOV: 25 Pt here for f/u presents with B/L neck and shoulder pain, radiates up to head. Admits improvement with PT. Admits intermittent N/T. Rates pain 5/10.  Current Medication: gabapentin, flexeril  Patient gives verbal consent to use Abridge.          History of Present Illness  Taya Bruce is a 69 year old female with chronic neck pain who presents for follow-up of her condition.    Her neck pain is currently at a level of 5 out of 10, which she describes as manageable. She underwent bilateral C5 and C6-C7 facet joint injections in February and reports that she thinks her pain is better. At that time, her pain was at a level of 3 out of 10. She thinks her pain is better.    She has been performing exercises at home, which she finds easier to do while sitting in her van. She attends physical therapy sessions once a week on  and has two or three sessions remaining. She has been going to physical therapy for almost a year for both her neck and back.    She is currently taking gabapentin 100 mg once or twice daily and cyclobenzaprine 5 mg at night. She believes the cyclobenzaprine helps her and is hesitant to stop taking it. Her dosage was previously reduced from 10 mg to 5 mg.       PAST MEDICAL HISTORY:  Past Medical History[1]    SURGICAL HISTORY:  Past Surgical History[2]    SOCIAL HISTORY:   Social History     Occupational History    Not on file   Tobacco Use    Smoking status: Former     Current packs/day: 0.00     Types: Cigarettes     Quit date: 2023     Years since quittin.4    Smokeless tobacco: Never    Tobacco comments:     I quit for 9 years, went back to smoking    Vaping Use    Vaping status: Never Used   Substance and Sexual Activity    Alcohol use: Not Currently    Drug use: Never    Sexual activity: Not on  file       FAMILY HISTORY:   Family History[3]    CURRENT MEDICATIONS:   Current Medications[4]    ALLERGIES:   Allergies[5]    REVIEW OF SYSTEMS:   Review of Systems   Constitutional: Negative.    HENT: Negative.    Eyes: Negative.    Respiratory: Negative.    Cardiovascular: Negative.    Gastrointestinal: Negative.    Genitourinary: Negative.    Musculoskeletal: As per HPI  Skin: Negative.    Neurological: As per HPI  Endo/Heme/Allergies: Negative.    Psychiatric/Behavioral: Negative.      All other systems reviewed and are negative. Pertinent positives and negatives noted in the HPI.    PHYSICAL EXAM:   Ht 69\"   Wt 150 lb (68 kg)   BMI 22.15 kg/m²     Body mass index is 22.15 kg/m².      General: No immediate distress  Head: Normocephalic/ Atraumatic  Eyes: Extra-occular movements intact.   Ears: No auricular hematoma or deformities  Mouth: No lesions or ulcerations  Heart: peripheral pulses intact. Normal capillary refill.   Lungs: Non-labored respirations  Abdomen: No abdominal guarding  Extremities: No lower extremity edema bilaterally   Skin: No lesions noted.   Cognition: alert & oriented x 3, attentive, able to follow 2 step commands, comprehension intact, spontaneous speech intact  Motor:    Musculoskeletal:        Data  [facility] Lab Encounter on 02/12/2025   Component Date Value Ref Range Status    WBC 02/12/2025 5.3  4.0 - 11.0 x10(3) uL Final    RBC 02/12/2025 4.08  3.80 - 5.30 x10(6)uL Final    HGB 02/12/2025 12.6  12.0 - 16.0 g/dL Final    HCT 02/12/2025 37.1  35.0 - 48.0 % Final    MCV 02/12/2025 90.9  80.0 - 100.0 fL Final    MCH 02/12/2025 30.9  26.0 - 34.0 pg Final    MCHC 02/12/2025 34.0  31.0 - 37.0 g/dL Final    RDW-SD 02/12/2025 42.5  35.1 - 46.3 fL Final    RDW 02/12/2025 12.8  11.0 - 15.0 % Final    PLT 02/12/2025 202.0  150.0 - 450.0 10(3)uL Final    Neutrophil Absolute Prelim 02/12/2025 2.22  1.50 - 7.70 x10 (3) uL Final    Neutrophil Absolute 02/12/2025 2.22  1.50 - 7.70 x10(3) uL Final     Lymphocyte Absolute 02/12/2025 2.26  1.00 - 4.00 x10(3) uL Final    Monocyte Absolute 02/12/2025 0.64  0.10 - 1.00 x10(3) uL Final    Eosinophil Absolute 02/12/2025 0.10  0.00 - 0.70 x10(3) uL Final    Basophil Absolute 02/12/2025 0.03  0.00 - 0.20 x10(3) uL Final    Immature Granulocyte Absolute 02/12/2025 0.01  0.00 - 1.00 x10(3) uL Final    Neutrophil % 02/12/2025 42.1  % Final    Lymphocyte % 02/12/2025 43.0  % Final    Monocyte % 02/12/2025 12.2  % Final    Eosinophil % 02/12/2025 1.9  % Final    Basophil % 02/12/2025 0.6  % Final    Immature Granulocyte % 02/12/2025 0.2  % Final    Glucose 02/12/2025 83  70 - 99 mg/dL Final    Sodium 02/12/2025 144  136 - 145 mmol/L Final    Potassium 02/12/2025 3.4 (L)  3.5 - 5.1 mmol/L Final    Chloride 02/12/2025 107  98 - 112 mmol/L Final    CO2 02/12/2025 30.0  21.0 - 32.0 mmol/L Final    Anion Gap 02/12/2025 7  0 - 18 mmol/L Final    BUN 02/12/2025 11  9 - 23 mg/dL Final    Creatinine 02/12/2025 0.77  0.55 - 1.02 mg/dL Final    BUN/CREA Ratio 02/12/2025 14.3  10.0 - 20.0 Final    Calcium, Total 02/12/2025 9.2  8.7 - 10.4 mg/dL Final    Calculated Osmolality 02/12/2025 297 (H)  275 - 295 mOsm/kg Final    eGFR-Cr 02/12/2025 83  >=60 mL/min/1.73m2 Final    ALT 02/12/2025 <7 (L)  10 - 49 U/L Final    AST 02/12/2025 21  <34 U/L Final    Alkaline Phosphatase 02/12/2025 71  55 - 142 U/L Final    Bilirubin, Total 02/12/2025 0.4  0.2 - 1.1 mg/dL Final    Total Protein 02/12/2025 6.6  5.7 - 8.2 g/dL Final    Albumin 02/12/2025 4.3  3.2 - 4.8 g/dL Final    Globulin  02/12/2025 2.3  2.0 - 3.5 g/dL Final    A/G Ratio 02/12/2025 1.9  1.0 - 2.0 Final    Patient Fasting for CMP? 02/12/2025 No   Final    TSH 02/12/2025 2.328  0.550 - 4.780 uIU/mL Final   ]           Radiology Imaging:  I reviewed with the patient her MRI of the cervical spine   MRI SPINE CERVICAL (CPT=72141)  Narrative: PROCEDURE:MRI SPINE CERVICAL (CPT=72141)     COMPARISON:[facility], XR CERVICAL SPINE  AP LAT FLEX EXT EM (CPT=72050), 2/07/2025, 12:46 PM.     INDICATIONS:Cervical degenerative disc disease with cervical facet syndrome and radiculopathy; hyperreflexia; Morris reflex positive (R29.2, M54.12, M50.30, M47.812).     TECHNIQUE:  A variety of imaging planes and parameters were utilized for visualization of suspected pathology.       FINDINGS:        ALIGNMENT:The anatomic cervical lordosis is interrupted by minimal anterolisthesis of C3 on C4 and slight retrolisthesis C5 on C6.  BONES:No fractures or osseous lesions are apparent. Multilevel anterior osteophyte formation is demonstrated, most notably at the C4-C7 levels.  CORD: Normal caliber, contour, and signal intensity.    CRANIOCERVICAL AREA:    Normal foramen magnum without Chiari malformation.    PARASPINAL AREA: No visible mass.    OTHER:There is no visible swelling of the prevertebral soft tissues. Nodular mucosal thickening and mucous retention cyst formation of the right maxillary sinus is appreciated.     CERVICAL DISC LEVELS:  C2-C3:A tiny central posterior disc protrusion is appreciated. There is no significant spinal canal or foraminal narrowing.  C3-C4:Mild disc degeneration is observed, and there is a central disc protrusion. Mild facet arthropathy is manifested without significant spinal canal or foraminal encroachment.  C4-C5:A tiny central disc protrusion is noted. There is bilateral uncovertebral hypertrophy. Left worse than right facet arthropathy is appreciated. There does not appear to be significant spinal canal or foraminal compromise.  C5-C6: Posterior disc osteophyte complex formation is apparent with prominent left larger than right foraminal protrusions and left greater than right uncovertebral hypertrophy. These findings cause mild spinal canal narrowing with severe bilateral   foraminal impingement.  C6-C7: Posterior disc osteophyte complex formation is seen with prominent central and left larger than right foraminal  protrusions. There is left greater than right uncovertebral hypertrophy. These findings contribute to mild spinal canal narrowing   abutment (but no indentation) of the ventral cord contour.  C7-T1:Left-sided perineural cysts are noted; there is no significant disc degeneration, facet abnormality, spinal canal stenosis, or foraminal stenosis.                 Impression: CONCLUSION:     1. Degenerative disc disease, particularly at C5-C6 and C6-C7, where there is mild spinal canal narrowing.     2. Severe bilateral foraminal compromise at C5-C6.     3. Negative for abnormal intrinsic cord signal intensity.     4. No acute osseous injury of the cervical spine is apparent.     5. Lesser incidental findings as above.           elm-remote.        Dictated by (CST): Carl Modi MD on 2/11/2025 at 9:33 PM       Finalized by (CST): Carl Modi MD on 2/11/2025 at 9:40 PM          XR CERVICAL SPINE AP LAT FLEX EXT (CPT=72050)  Narrative: PROCEDURE:XR CERVICAL SPINE AP LAT FLEX EXT EM (CPT=72050)     COMPARISON:None.     INDICATIONS:            Chronic neck pain. Evaluation for  Cervical radiculopathy, DDD (degenerative disc disease), cervical , Cervical facet syndrom*     TECHNIQUE:Cervical spine radiographs with flexion and extension views. (4 views)     FINDINGS:            ALIGNMENT:  Normal alignment.    ATLANTOAXIAL:        Normal odontoid and atlantoaxial joints.    VERTEBRAL BODIES:            No acute fracture or malalignment.  There is multilevel degenerative change with marginal osteophyte formation and facet arthropathy.  DISC SPACES:           Multilevel disc space narrowing most significant at C5-C6 and C6-C7.  PREVERTEBRAL SOFT TISSUES:   Negative.    FLEXION/EXTENSION:         Limited range of motion.  No subluxation during flexion and extension.    OTHER:          Negative.                Impression: CONCLUSION:        Moderate cervical spine degenerative change most significant C5-C6 and C6-C7.            Dictated by (CST): Zia Wood MD on 2/11/2025 at 4:11 PM       Finalized by (CST): Zia Wood MD on 2/11/2025 at 4:16 PM        ASSESSMENT AND PLAN:  Taya hwang a pleasant 69-year-old female presents for follow-up of her bilateral neck and parascapular pain.  She continues to have some scapular winging but her pain has improved.  I am recommending she continue a home exercise program and gabapentin 100 mg twice per day.  She should also use Flexeril at nighttime.  We can consider repeating the facet joint injections if her pain worsens but she is very happy currently with her symptom management.     Assessment & Plan  Cervical facet syndrome  Chronic cervical facet syndrome with neck pain improved after facet joint injections. She manages well with current treatment and physical therapy.  - Continue home exercise program.  - Complete current physical therapy sessions and reassess.  - Continue gabapentin 100 mg once or twice daily.  - Refill cyclobenzaprine 5 mg for nighttime use.  - Consider reducing cyclobenzaprine to every other night if comfortable.  - Follow up in 2 months.  - Contact provider if symptoms worsen.      RTC in 2 months  Discharge Instructions were provided as documented in AVS summary.  The patient was in agreement with the assessment and plan.  All questions were answered.  There were no barriers to learning.         1. Trigger point of neck    2. Cervical facet syndrome    3. Foraminal stenosis of cervical region    4. DDD (degenerative disc disease), cervical    5. Myalgia    6. Chronic left-sided low back pain with left-sided sciatica    7. Facet syndrome, lumbar    8. Mechanical low back pain    9. Lumbar spondylosis    10. Degeneration of intervertebral disc of lumbosacral region with discogenic back pain and lower extremity pain    11. Bulge of lumbar disc without myelopathy    12. Lumbar foraminal stenosis    13. Lumbar disc herniation with radiculopathy        Niko HAMILTON  Behar MD  Physical Medicine and Rehabilitation/Sports Medicine  [facility]  [employer] Cures Act Notice to Patient: Medical documents like this are made available to patients in the interest of transparency. However, be advised this is a medical document and it is intended as [id number] communication between your medical providers. This medical document may contain abbreviations, assessments, medical data, and results or other terms that are unfamiliar. Medical documents are intended to carry relevant information, facts as evident, and the clinical opinion of the practitioner. As such, this medical document may be written in language that appears blunt or direct. You are encouraged to contact your medical provider and/or [facility] Patient Experience if you have any questions about this medical document.   Abridge tool was used for dictation purposes only and the patient was not recorded at any point during the visit.              [1]   Past Medical History:   Allergic rhinitis    Arthritis    Calculus of kidney    Colon polyp    Constipation    COPD (chronic obstructive pulmonary disease) (HCC)    Diverticular disease    Esophageal reflux    Fibromyalgia    IBS (irritable bowel syndrome)    Osteoarthritis    Osteoporosis    PONV (postoperative nausea and vomiting)   [2]   Past Surgical History:  Procedure Laterality Date    Appendectomy      Cholecystectomy      Colonoscopy      D & c      Hernia surgery      Hysterectomy      Other surgical history      Total abdom hysterectomy     [3]   Family History  Problem Relation Age of Onset    Hypertension Father     Stroke Father     Dementia Mother     Depression Mother     Heart Disorder Mother     Pulmonary Disease Mother     Cancer Maternal Uncle     Heart Disease Brother         Coronary heart disease, hypertension    Hypertension Brother         Hypertension    Heart Disorder Brother         Coronary Artery Disease    Hypertension  Sister     Diabetes Sister     Hypertension Brother     Hypertension Brother         Hypertension    Heart Disorder Maternal Grandmother     Heart Disorder Paternal Grandfather     Diabetes Paternal Grandfather     Infectious Disease Brother     Infectious Disease Brother    [4]   Current Outpatient Medications   Medication Sig Dispense Refill    GABAPENTIN 100 MG Oral Cap TAKE 1 CAPSULE BY MOUTH TWO TIMES DAILY 60 capsule 0    cyclobenzaprine 5 MG Oral Tab take one-half to two tablets 3 times per day as needed for spasms *Do not operate heavy machinery while on this medication as it may make you sleepy* 90 tablet 0    alendronate 70 MG Oral Tab Take 1 tablet (70 mg total) by mouth once a week. 12 tablet 3    OXYBUTYNIN ER 10 MG Oral Tablet 24 Hr Take 1 tablet by mouth daily 90 tablet 0    TURMERIC OR Take by mouth.     [5]   Allergies  Allergen Reactions    Strawberry HIVES     strawberries    Penicillin V UNKNOWN

## 2025-07-14 NOTE — PATIENT INSTRUCTIONS
1) Lets finish off this round of therapy and see how things go with your home exercise program   2) Lets keep an eye on the neck pain. If the pain returns, and continues to be closer tot he ehad, then we will consider a BILATERAL C3-C4 and C4-C5 facet joint injections. If the pain is lower, then we will repeat the last injections we did  3) Continue gabapentin 100 mg 1-2 times per day  4) Lets touch base in about 2 months   5) Continue Cyclobenzaprine 5 mg at night

## 2025-07-18 RX ORDER — OXYBUTYNIN CHLORIDE 10 MG/1
10 TABLET, EXTENDED RELEASE ORAL DAILY
Qty: 90 TABLET | Refills: 3 | Status: SHIPPED | OUTPATIENT
Start: 2025-07-18

## 2025-08-06 ENCOUNTER — MED REC SCAN ONLY (OUTPATIENT)
Dept: PHYSICAL MEDICINE AND REHAB | Facility: CLINIC | Age: 70
End: 2025-08-06

## 2025-08-18 RX ORDER — GABAPENTIN 100 MG/1
100 CAPSULE ORAL 2 TIMES DAILY
Qty: 60 CAPSULE | Refills: 0 | Status: SHIPPED | OUTPATIENT
Start: 2025-08-18

## 2025-08-25 ENCOUNTER — OFFICE VISIT (OUTPATIENT)
Dept: PHYSICAL MEDICINE AND REHAB | Facility: CLINIC | Age: 70
End: 2025-08-25

## 2025-08-25 ENCOUNTER — TELEPHONE (OUTPATIENT)
Dept: PHYSICAL MEDICINE AND REHAB | Facility: CLINIC | Age: 70
End: 2025-08-25

## 2025-08-25 VITALS — BODY MASS INDEX: 22.22 KG/M2 | HEIGHT: 69 IN | RESPIRATION RATE: 14 BRPM | WEIGHT: 150 LBS

## 2025-08-25 DIAGNOSIS — M54.2 TRIGGER POINT OF NECK: Primary | ICD-10-CM

## 2025-08-25 DIAGNOSIS — M54.12 CERVICAL RADICULOPATHY: ICD-10-CM

## 2025-08-25 DIAGNOSIS — M79.10 MYALGIA: ICD-10-CM

## 2025-08-25 DIAGNOSIS — M47.812 CERVICAL FACET SYNDROME: ICD-10-CM

## 2025-08-25 DIAGNOSIS — M95.8 WINGING OF SCAPULA: ICD-10-CM

## 2025-08-25 PROCEDURE — 1125F AMNT PAIN NOTED PAIN PRSNT: CPT | Performed by: NURSE PRACTITIONER

## 2025-08-25 PROCEDURE — 99214 OFFICE O/P EST MOD 30 MIN: CPT | Performed by: NURSE PRACTITIONER

## 2025-08-25 PROCEDURE — 1160F RVW MEDS BY RX/DR IN RCRD: CPT | Performed by: NURSE PRACTITIONER

## 2025-08-25 PROCEDURE — 1159F MED LIST DOCD IN RCRD: CPT | Performed by: NURSE PRACTITIONER

## 2025-08-25 PROCEDURE — 3008F BODY MASS INDEX DOCD: CPT | Performed by: NURSE PRACTITIONER

## (undated) DIAGNOSIS — K21.9 GASTROESOPHAGEAL REFLUX DISEASE, UNSPECIFIED WHETHER ESOPHAGITIS PRESENT: ICD-10-CM

## (undated) DIAGNOSIS — Z12.11 COLON CANCER SCREENING: Primary | ICD-10-CM

## (undated) DIAGNOSIS — Z87.19 HISTORY OF GASTRIC POLYP: ICD-10-CM